# Patient Record
Sex: FEMALE | Race: WHITE | NOT HISPANIC OR LATINO | Employment: FULL TIME | URBAN - METROPOLITAN AREA
[De-identification: names, ages, dates, MRNs, and addresses within clinical notes are randomized per-mention and may not be internally consistent; named-entity substitution may affect disease eponyms.]

---

## 2017-05-31 ENCOUNTER — OFFICE VISIT (OUTPATIENT)
Dept: SPORTS MEDICINE | Facility: CLINIC | Age: 47
End: 2017-05-31

## 2017-05-31 VITALS
HEART RATE: 93 BPM | HEIGHT: 63 IN | DIASTOLIC BLOOD PRESSURE: 88 MMHG | BODY MASS INDEX: 33.66 KG/M2 | WEIGHT: 190 LBS | RESPIRATION RATE: 16 BRPM | OXYGEN SATURATION: 98 % | SYSTOLIC BLOOD PRESSURE: 128 MMHG

## 2017-05-31 DIAGNOSIS — M99.00 CRANIAL SOMATIC DYSFUNCTION: ICD-10-CM

## 2017-05-31 DIAGNOSIS — M54.2 NECK PAIN: Primary | ICD-10-CM

## 2017-05-31 DIAGNOSIS — M99.01 CERVICAL SOMATIC DYSFUNCTION: ICD-10-CM

## 2017-05-31 DIAGNOSIS — M99.02 THORACIC REGION SOMATIC DYSFUNCTION: ICD-10-CM

## 2017-05-31 DIAGNOSIS — M79.602 LEFT ARM PAIN: ICD-10-CM

## 2017-05-31 PROCEDURE — 98926 OSTEOPATH MANJ 3-4 REGIONS: CPT | Performed by: FAMILY MEDICINE

## 2017-05-31 PROCEDURE — 99213 OFFICE O/P EST LOW 20 MIN: CPT | Performed by: FAMILY MEDICINE

## 2017-07-21 RX ORDER — FLUCONAZOLE 150 MG/1
150 TABLET ORAL DAILY
Qty: 3 TABLET | Refills: 0 | Status: SHIPPED | OUTPATIENT
Start: 2017-07-21 | End: 2017-11-20

## 2017-11-01 DIAGNOSIS — F41.9 ANXIETY: ICD-10-CM

## 2017-11-01 RX ORDER — ESCITALOPRAM OXALATE 20 MG/1
20 TABLET ORAL DAILY
Qty: 30 TABLET | Refills: 0 | Status: SHIPPED | OUTPATIENT
Start: 2017-11-01 | End: 2017-11-20 | Stop reason: SDUPTHER

## 2017-11-20 ENCOUNTER — OFFICE VISIT (OUTPATIENT)
Dept: FAMILY MEDICINE CLINIC | Facility: CLINIC | Age: 47
End: 2017-11-20

## 2017-11-20 VITALS
BODY MASS INDEX: 35.12 KG/M2 | TEMPERATURE: 98.3 F | DIASTOLIC BLOOD PRESSURE: 70 MMHG | HEART RATE: 94 BPM | WEIGHT: 198.2 LBS | HEIGHT: 63 IN | OXYGEN SATURATION: 98 % | SYSTOLIC BLOOD PRESSURE: 118 MMHG

## 2017-11-20 DIAGNOSIS — R52 BODY ACHES: ICD-10-CM

## 2017-11-20 DIAGNOSIS — K21.9 GASTROESOPHAGEAL REFLUX DISEASE WITHOUT ESOPHAGITIS: ICD-10-CM

## 2017-11-20 DIAGNOSIS — J06.9 ACUTE URI: ICD-10-CM

## 2017-11-20 DIAGNOSIS — E53.8 COBALAMIN DEFICIENCY: ICD-10-CM

## 2017-11-20 DIAGNOSIS — E78.01 FAMILIAL HYPERCHOLESTEROLEMIA: ICD-10-CM

## 2017-11-20 DIAGNOSIS — Z00.00 ROUTINE GENERAL MEDICAL EXAMINATION AT A HEALTH CARE FACILITY: ICD-10-CM

## 2017-11-20 DIAGNOSIS — F41.9 ANXIETY: ICD-10-CM

## 2017-11-20 DIAGNOSIS — R73.03 PREDIABETES: Primary | ICD-10-CM

## 2017-11-20 DIAGNOSIS — E55.9 VITAMIN D DEFICIENCY: ICD-10-CM

## 2017-11-20 LAB
EXPIRATION DATE: NORMAL
FLUAV AG NPH QL: NEGATIVE
FLUBV AG NPH QL: NEGATIVE
HBA1C MFR BLD: 6.7 %
INTERNAL CONTROL: NORMAL
Lab: NORMAL

## 2017-11-20 PROCEDURE — 99214 OFFICE O/P EST MOD 30 MIN: CPT | Performed by: NURSE PRACTITIONER

## 2017-11-20 PROCEDURE — 83036 HEMOGLOBIN GLYCOSYLATED A1C: CPT | Performed by: NURSE PRACTITIONER

## 2017-11-20 PROCEDURE — 87804 INFLUENZA ASSAY W/OPTIC: CPT | Performed by: NURSE PRACTITIONER

## 2017-11-20 RX ORDER — ESOMEPRAZOLE MAGNESIUM 40 MG/1
40 CAPSULE, DELAYED RELEASE ORAL
Qty: 90 CAPSULE | Refills: 3 | Status: SHIPPED | OUTPATIENT
Start: 2017-11-20 | End: 2018-08-20

## 2017-11-20 RX ORDER — ESCITALOPRAM OXALATE 20 MG/1
20 TABLET ORAL DAILY
Qty: 90 TABLET | Refills: 3 | Status: SHIPPED | OUTPATIENT
Start: 2017-11-20 | End: 2018-08-20 | Stop reason: SDUPTHER

## 2017-11-20 NOTE — PROGRESS NOTES
Subjective   Lucy Hilton is a 47 y.o. female.     History of Present Illness   Lucy Hilton 47 y.o. female who presents today for routine follow up check and medication refills.  she has a history of   Patient Active Problem List   Diagnosis   • Anxiety   • Osteoarthritis of carpometacarpal joint of thumb   • Radial styloid tenosynovitis   • Gastroesophageal reflux disease   • Hyperlipidemia   • Cobalamin deficiency   • Vitamin D deficiency   • Prediabetes   • Irritable bowel syndrome   • Body aches   • Acute URI   .  Since the last visit, she has overall felt well.  She has GERD and is well controlled on PPI medication, Hyperlipidemia and here to discuss treatment, Vitamin D deficiency and will update labs to confirm level is at goal >30 and anxiety is well controlled.  she has been compliant with current medications have reviewed them.  The patient denies medication side effects.    Results for orders placed or performed in visit on 11/20/17   POC Glycosylated Hemoglobin (Hb A1C)   Result Value Ref Range    Hemoglobin A1C 6.7 %   POC Influenza A / B   Result Value Ref Range    Rapid Influenza A Ag negative     Rapid Influenza B Ag negative     Internal Control Passed Passed    Lot Number [id number]     Expiration Date 6/2019        Upper Respiratory Infection: Patient complains of symptoms of a URI. Symptoms include sore throat and swollen glands. Onset of symptoms was 3 days ago, unchanged since that time. She also c/o achiness for the past 3 days .  She is drinking plenty of fluids. Evaluation to date: none. Treatment to date: none.        The following portions of the patient's history were reviewed and updated as appropriate: allergies, current medications, past social history and problem list.    Review of Systems   Constitutional: Negative for fever.   All other systems reviewed and are negative.      Objective   /70 (BP Location: Left arm, Patient Position: Sitting)  Pulse 94  Temp 98.3  "°F (36.8 °C)  Ht 63\" (160 cm)  Wt 198 lb 3.2 oz (89.9 kg)  SpO2 98%  BMI 35.11 kg/m2  Physical Exam   Constitutional: She is oriented to person, place, and time. Vital signs are normal. She appears well-developed and well-nourished. No distress.   HENT:   Head: Normocephalic.   Cardiovascular: Normal rate, regular rhythm and normal heart sounds.    Pulmonary/Chest: Effort normal and breath sounds normal.   Neurological: She is alert and oriented to person, place, and time. Gait normal.   Psychiatric: She has a normal mood and affect. Her behavior is normal. Judgment and thought content normal.   Vitals reviewed.      Assessment/Plan   Problem List Items Addressed This Visit        Cardiovascular and Mediastinum    Hyperlipidemia    Relevant Orders    Comprehensive Metabolic Panel    Lipid Panel With LDL / HDL Ratio       Respiratory    Acute URI       Digestive    Gastroesophageal reflux disease    Relevant Medications    esomeprazole (NEXIUM) 40 MG capsule    Cobalamin deficiency    Relevant Orders    Vitamin B12 & Folate    Vitamin D deficiency    Relevant Orders    Vitamin D 25 Hydroxy       Other    Anxiety    Relevant Medications    escitalopram (LEXAPRO) 20 MG tablet    Prediabetes - Primary    Relevant Medications    metFORMIN (GLUCOPHAGE) 1000 MG tablet    Other Relevant Orders    POC Glycosylated Hemoglobin (Hb A1C) (Completed)    Comprehensive Metabolic Panel    Body aches    Relevant Orders    POC Influenza A / B      Other Visit Diagnoses     Routine general medical examination at a health care facility        Relevant Orders    CBC & Differential           follow up after labs   rto in 6 mons  Cont with meds   "

## 2017-12-20 RX ORDER — OXAZEPAM 10 MG
10 CAPSULE ORAL 3 TIMES DAILY PRN
Qty: 90 CAPSULE | Refills: 0 | OUTPATIENT
Start: 2017-12-20 | End: 2018-11-20 | Stop reason: SDUPTHER

## 2018-01-11 RX ORDER — OSELTAMIVIR PHOSPHATE 75 MG/1
75 CAPSULE ORAL 2 TIMES DAILY
Qty: 10 CAPSULE | Refills: 0 | Status: SHIPPED | OUTPATIENT
Start: 2018-01-11 | End: 2018-08-20

## 2018-08-20 ENCOUNTER — OFFICE VISIT (OUTPATIENT)
Dept: FAMILY MEDICINE CLINIC | Facility: CLINIC | Age: 48
End: 2018-08-20

## 2018-08-20 VITALS
SYSTOLIC BLOOD PRESSURE: 126 MMHG | HEART RATE: 96 BPM | TEMPERATURE: 98.3 F | OXYGEN SATURATION: 99 % | HEIGHT: 63 IN | DIASTOLIC BLOOD PRESSURE: 82 MMHG | BODY MASS INDEX: 33.49 KG/M2 | WEIGHT: 189 LBS

## 2018-08-20 DIAGNOSIS — E53.8 COBALAMIN DEFICIENCY: ICD-10-CM

## 2018-08-20 DIAGNOSIS — R73.03 PREDIABETES: Primary | ICD-10-CM

## 2018-08-20 DIAGNOSIS — E55.9 VITAMIN D DEFICIENCY: ICD-10-CM

## 2018-08-20 DIAGNOSIS — F41.9 ANXIETY: ICD-10-CM

## 2018-08-20 DIAGNOSIS — E78.01 FAMILIAL HYPERCHOLESTEROLEMIA: ICD-10-CM

## 2018-08-20 DIAGNOSIS — K21.9 GASTROESOPHAGEAL REFLUX DISEASE WITHOUT ESOPHAGITIS: ICD-10-CM

## 2018-08-20 PROBLEM — J06.9 ACUTE URI: Status: RESOLVED | Noted: 2017-11-20 | Resolved: 2018-08-20

## 2018-08-20 PROBLEM — R52 BODY ACHES: Status: RESOLVED | Noted: 2017-11-20 | Resolved: 2018-08-20

## 2018-08-20 LAB — HBA1C MFR BLD: 6.3 %

## 2018-08-20 PROCEDURE — 99214 OFFICE O/P EST MOD 30 MIN: CPT | Performed by: NURSE PRACTITIONER

## 2018-08-20 PROCEDURE — 83036 HEMOGLOBIN GLYCOSYLATED A1C: CPT | Performed by: NURSE PRACTITIONER

## 2018-08-20 RX ORDER — ESCITALOPRAM OXALATE 20 MG/1
20 TABLET ORAL DAILY
Qty: 90 TABLET | Refills: 3 | Status: SHIPPED | OUTPATIENT
Start: 2018-08-20 | End: 2019-08-21 | Stop reason: SDUPTHER

## 2018-08-20 RX ORDER — ERGOCALCIFEROL 1.25 MG/1
50000 CAPSULE ORAL WEEKLY
Qty: 12 CAPSULE | Refills: 3 | Status: SHIPPED | OUTPATIENT
Start: 2018-08-20 | End: 2019-02-19

## 2018-08-20 NOTE — PROGRESS NOTES
"Subjective   Lucy Hilton is a 48 y.o. female.     History of Present Illness   Lucy Hilton 48 y.o. female who presents today for routine follow up check and medication refills.  she has a history of   Patient Active Problem List   Diagnosis   • Anxiety   • Osteoarthritis of carpometacarpal joint of thumb   • Radial styloid tenosynovitis   • Gastroesophageal reflux disease   • Hyperlipidemia   • Cobalamin deficiency   • Vitamin D deficiency   • Prediabetes   • Irritable bowel syndrome   .  Since the last visit, she has overall felt well.  She has DMII and is well controlled on medication, GERD and is well controlled on PPI medication, Hyperlipidemia and here to discuss treatment, Vitamin D deficiency and will update labs to confirm level is at goal >30 and anxiety under control.  she has been compliant with current medications have reviewed them.  The patient denies medication side effects.    Results for orders placed or performed in visit on 08/20/18   POC Glycosylated Hemoglobin (Hb A1C)   Result Value Ref Range    Hemoglobin A1C 6.3 %         The following portions of the patient's history were reviewed and updated as appropriate: allergies, current medications, past social history and problem list.    Review of Systems   Constitutional: Negative for fever.   All other systems reviewed and are negative.      Objective   /82 (BP Location: Right arm, Patient Position: Sitting)   Pulse 96   Temp 98.3 °F (36.8 °C)   Ht 160 cm (62.99\")   Wt 85.7 kg (189 lb)   SpO2 99%   BMI 33.49 kg/m²   Physical Exam   Constitutional: She is oriented to person, place, and time. Vital signs are normal. She appears well-developed and well-nourished. No distress.   HENT:   Head: Normocephalic.   Cardiovascular: Normal rate, regular rhythm and normal heart sounds.    Pulmonary/Chest: Effort normal and breath sounds normal.   Neurological: She is alert and oriented to person, place, and time. Gait normal. "   Psychiatric: She has a normal mood and affect. Her behavior is normal. Judgment and thought content normal.   Vitals reviewed.      Assessment/Plan   Problem List Items Addressed This Visit        Cardiovascular and Mediastinum    Hyperlipidemia    Relevant Orders    Comprehensive Metabolic Panel    Lipid Panel With LDL / HDL Ratio       Digestive    Gastroesophageal reflux disease    Cobalamin deficiency    Relevant Orders    Vitamin B12 & Folate    Vitamin D deficiency    Relevant Medications    vitamin D (ERGOCALCIFEROL) 36577 units capsule capsule    Other Relevant Orders    Vitamin D 25 Hydroxy       Other    Anxiety    Relevant Medications    escitalopram (LEXAPRO) 20 MG tablet    Prediabetes - Primary    Relevant Medications    metFORMIN (GLUCOPHAGE) 1000 MG tablet    Other Relevant Orders    POC Glycosylated Hemoglobin (Hb A1C) (Completed)    Comprehensive Metabolic Panel        rto in 6 mons   Follow up after labs

## 2018-08-21 LAB
25(OH)D3+25(OH)D2 SERPL-MCNC: 24.2 NG/ML (ref 30–100)
ALBUMIN SERPL-MCNC: 4.3 G/DL (ref 3.5–5.2)
ALBUMIN/GLOB SERPL: 1.7 G/DL
ALP SERPL-CCNC: 78 U/L (ref 39–117)
ALT SERPL-CCNC: 17 U/L (ref 1–33)
AST SERPL-CCNC: 13 U/L (ref 1–32)
BILIRUB SERPL-MCNC: 0.2 MG/DL (ref 0.1–1.2)
BUN SERPL-MCNC: 8 MG/DL (ref 6–20)
BUN/CREAT SERPL: 9.4 (ref 7–25)
CALCIUM SERPL-MCNC: 9.9 MG/DL (ref 8.6–10.5)
CHLORIDE SERPL-SCNC: 101 MMOL/L (ref 98–107)
CHOLEST SERPL-MCNC: 242 MG/DL (ref 0–200)
CO2 SERPL-SCNC: 25.7 MMOL/L (ref 22–29)
CREAT SERPL-MCNC: 0.85 MG/DL (ref 0.57–1)
FOLATE SERPL-MCNC: 10.99 NG/ML (ref 4.78–24.2)
GLOBULIN SER CALC-MCNC: 2.5 GM/DL
GLUCOSE SERPL-MCNC: 121 MG/DL (ref 65–99)
HDLC SERPL-MCNC: 49 MG/DL (ref 40–60)
LDLC SERPL CALC-MCNC: 159 MG/DL (ref 0–100)
LDLC/HDLC SERPL: 3.24 {RATIO}
POTASSIUM SERPL-SCNC: 4.6 MMOL/L (ref 3.5–5.2)
PROT SERPL-MCNC: 6.8 G/DL (ref 6–8.5)
SODIUM SERPL-SCNC: 140 MMOL/L (ref 136–145)
TRIGL SERPL-MCNC: 172 MG/DL (ref 0–150)
VIT B12 SERPL-MCNC: 224 PG/ML (ref 211–946)
VLDLC SERPL CALC-MCNC: 34.4 MG/DL (ref 5–40)

## 2018-10-15 ENCOUNTER — TELEPHONE (OUTPATIENT)
Dept: FAMILY MEDICINE CLINIC | Facility: CLINIC | Age: 48
End: 2018-10-15

## 2018-10-15 DIAGNOSIS — R73.03 PRE-DIABETES: Primary | ICD-10-CM

## 2018-10-15 NOTE — TELEPHONE ENCOUNTER
Patient is wanting a referral to a diabetic nutrition specialist she will schedule her own appointment just needs referral in

## 2018-11-20 RX ORDER — OXAZEPAM 10 MG
10 CAPSULE ORAL 3 TIMES DAILY PRN
Qty: 90 CAPSULE | Refills: 0 | Status: SHIPPED | OUTPATIENT
Start: 2018-11-20 | End: 2020-09-18 | Stop reason: SDUPTHER

## 2018-12-17 DIAGNOSIS — R73.03 PREDIABETES: ICD-10-CM

## 2019-02-19 ENCOUNTER — APPOINTMENT (OUTPATIENT)
Dept: PREADMISSION TESTING | Facility: HOSPITAL | Age: 49
End: 2019-02-19

## 2019-02-19 VITALS
RESPIRATION RATE: 20 BRPM | TEMPERATURE: 97.5 F | WEIGHT: 198.3 LBS | BODY MASS INDEX: 35.14 KG/M2 | SYSTOLIC BLOOD PRESSURE: 137 MMHG | OXYGEN SATURATION: 100 % | HEART RATE: 96 BPM | DIASTOLIC BLOOD PRESSURE: 80 MMHG | HEIGHT: 63 IN

## 2019-02-19 LAB
ANION GAP SERPL CALCULATED.3IONS-SCNC: 9.9 MMOL/L
B-HCG UR QL: NEGATIVE
BASOPHILS # BLD AUTO: 0.08 10*3/MM3 (ref 0–0.2)
BASOPHILS NFR BLD AUTO: 1 % (ref 0–1.5)
BUN BLD-MCNC: 10 MG/DL (ref 6–20)
BUN/CREAT SERPL: 11.4 (ref 7–25)
CALCIUM SPEC-SCNC: 9.6 MG/DL (ref 8.6–10.5)
CHLORIDE SERPL-SCNC: 100 MMOL/L (ref 98–107)
CO2 SERPL-SCNC: 28.1 MMOL/L (ref 22–29)
CREAT BLD-MCNC: 0.88 MG/DL (ref 0.57–1)
DEPRECATED RDW RBC AUTO: 44.4 FL (ref 37–54)
EOSINOPHIL # BLD AUTO: 0.27 10*3/MM3 (ref 0–0.4)
EOSINOPHIL NFR BLD AUTO: 3.5 % (ref 0.3–6.2)
ERYTHROCYTE [DISTWIDTH] IN BLOOD BY AUTOMATED COUNT: 12.9 % (ref 12.3–15.4)
GFR SERPL CREATININE-BSD FRML MDRD: 69 ML/MIN/1.73
GLUCOSE BLD-MCNC: 170 MG/DL (ref 65–99)
HCT VFR BLD AUTO: 50.6 % (ref 34–46.6)
HGB BLD-MCNC: 16.6 G/DL (ref 12–15.9)
IMM GRANULOCYTES # BLD AUTO: 0.03 10*3/MM3 (ref 0–0.05)
IMM GRANULOCYTES NFR BLD AUTO: 0.4 % (ref 0–0.5)
LYMPHOCYTES # BLD AUTO: 2.1 10*3/MM3 (ref 0.7–3.1)
LYMPHOCYTES NFR BLD AUTO: 27 % (ref 19.6–45.3)
MCH RBC QN AUTO: 30.9 PG (ref 26.6–33)
MCHC RBC AUTO-ENTMCNC: 32.8 G/DL (ref 31.5–35.7)
MCV RBC AUTO: 94.1 FL (ref 79–97)
MONOCYTES # BLD AUTO: 0.5 10*3/MM3 (ref 0.1–0.9)
MONOCYTES NFR BLD AUTO: 6.4 % (ref 5–12)
NEUTROPHILS # BLD AUTO: 4.81 10*3/MM3 (ref 1.4–7)
NEUTROPHILS NFR BLD AUTO: 61.7 % (ref 42.7–76)
NRBC BLD AUTO-RTO: 0 /100 WBC (ref 0–0)
PLATELET # BLD AUTO: 316 10*3/MM3 (ref 140–450)
PMV BLD AUTO: 9.9 FL (ref 6–12)
POTASSIUM BLD-SCNC: 4.4 MMOL/L (ref 3.5–5.2)
RBC # BLD AUTO: 5.38 10*6/MM3 (ref 3.77–5.28)
SODIUM BLD-SCNC: 138 MMOL/L (ref 136–145)
WBC NRBC COR # BLD: 7.79 10*3/MM3 (ref 3.4–10.8)

## 2019-02-19 PROCEDURE — 93005 ELECTROCARDIOGRAM TRACING: CPT

## 2019-02-19 PROCEDURE — 36415 COLL VENOUS BLD VENIPUNCTURE: CPT

## 2019-02-19 PROCEDURE — 93010 ELECTROCARDIOGRAM REPORT: CPT | Performed by: INTERNAL MEDICINE

## 2019-02-19 PROCEDURE — 81025 URINE PREGNANCY TEST: CPT | Performed by: OBSTETRICS & GYNECOLOGY

## 2019-02-19 PROCEDURE — 80048 BASIC METABOLIC PNL TOTAL CA: CPT | Performed by: OBSTETRICS & GYNECOLOGY

## 2019-02-19 PROCEDURE — 85025 COMPLETE CBC W/AUTO DIFF WBC: CPT | Performed by: OBSTETRICS & GYNECOLOGY

## 2019-02-19 NOTE — DISCHARGE INSTRUCTIONS
Take the following medications the morning of surgery with a small sip of water:  NONE    ARRIVAL TIME 0800 TO OUT SURGERY CENTER        General Instructions:  • Do not eat solid food after midnight the night before surgery.  • You may drink clear liquids day of surgery but must stop at least one hour before your hospital arrival time (CUTOFF TIME 0700)  • It is beneficial for you to have a clear drink that contains carbohydrates the day of surgery.  We suggest a 12 to 20 ounce bottle of Gatorade or Powerade for non-diabetic patients or a 12 to 20 ounce bottle of G2 or Powerade Zero for diabetic patients. (Pediatric patients, are not advised to drink a 12 to 20 ounce carbohydrate drink)    Clear liquids are liquids you can see through.  Nothing red in color.     Plain water                               Sports drinks  Sodas                                   Gelatin (Jell-O)  Fruit juices without pulp such as white grape juice and apple juice  Popsicles that contain no fruit or yogurt  Tea or coffee (no cream or milk added)  Gatorade / Powerade  G2 / Powerade Zero    • Infants may have breast milk up to four hours before surgery.  • Infants drinking formula may drink formula up to six hours before surgery.   • Patients who avoid smoking, chewing tobacco and alcohol for 4 weeks prior to surgery have a reduced risk of post-operative complications.  Quit smoking as many days before surgery as you can.  • Do not smoke, use chewing tobacco or drink alcohol the day of surgery.   • If applicable bring your C-PAP/ BI-PAP machine.  • Bring any papers given to you in the doctor’s office.  • Wear clean comfortable clothes and socks.  • Do not wear contact lenses or make-up.  Bring a case for your glasses.   • Bring crutches or walker if applicable.  • Remove all piercings.  Leave jewelry and any other valuables at home.  • Hair extensions with metal clips must be removed prior to surgery.  • The Pre-Admission Testing nurse  will instruct you to bring medications if unable to obtain an accurate list in Pre-Admission Testing.            Preventing a Surgical Site Infection:  • For 2 to 3 days before surgery, avoid shaving with a razor because the razor can irritate skin and make it easier to develop an infection.    • Any areas of open skin can increase the risk of a post-operative wound infection by allowing bacteria to enter and travel throughout the body.  Notify your surgeon if you have any skin wounds / rashes even if it is not near the expected surgical site.  The area will need assessed to determine if surgery should be delayed until it is healed.  • The night prior to surgery sleep in a clean bed with clean clothing.  Do not allow pets to sleep with you.  • Shower on the morning of surgery using a fresh bar of anti-bacterial soap (such as Dial) and clean washcloth.  Dry with a clean towel and dress in clean clothing.  • Ask your surgeon if you will be receiving antibiotics prior to surgery.  • Make sure you, your family, and all healthcare providers clean their hands with soap and water or an alcohol based hand  before caring for you or your wound.    Day of surgery:  Upon arrival, a Pre-op nurse and Anesthesiologist will review your health history, obtain vital signs, and answer questions you may have.  The only belongings needed at this time will be your home medications and if applicable your C-PAP/BI-PAP machine.  If you are staying overnight your family can leave the rest of your belongings in the car and bring them to your room later.  A Pre-op nurse will start an IV and you may receive medication in preparation for surgery, including something to help you relax.  Your family will be able to see you in the Pre-op area.  While you are in surgery your family should notify the waiting room  if they leave the waiting room area and provide a contact phone number.    Please be aware that surgery does come  with discomfort.  We want to make every effort to control your discomfort so please discuss any uncontrolled symptoms with your nurse.   Your doctor will most likely have prescribed pain medications.      If you are going home after surgery you will receive individualized written care instructions before being discharged.  A responsible adult must drive you to and from the hospital on the day of your surgery and stay with you for 24 hours.    If you are staying overnight following surgery, you will be transported to your hospital room following the recovery period.  [facility] has all private rooms.    You have received a list of surgical assistants for your reference.  If you have any questions please call Pre-Admission Testing at [phone].  Deductibles and co-payments are collected on the day of service. Please be prepared to pay the required co-pay, deductible or deposit on the day of service as defined by your plan.

## 2019-02-27 ENCOUNTER — ANESTHESIA EVENT (OUTPATIENT)
Dept: PERIOP | Facility: HOSPITAL | Age: 49
End: 2019-02-27

## 2019-02-27 ENCOUNTER — ANESTHESIA (OUTPATIENT)
Dept: PERIOP | Facility: HOSPITAL | Age: 49
End: 2019-02-27

## 2019-02-27 ENCOUNTER — HOSPITAL ENCOUNTER (OUTPATIENT)
Facility: HOSPITAL | Age: 49
Setting detail: HOSPITAL OUTPATIENT SURGERY
Discharge: HOME | End: 2019-02-27
Attending: OBSTETRICS & GYNECOLOGY | Admitting: OBSTETRICS & GYNECOLOGY

## 2019-02-27 VITALS
HEART RATE: 75 BPM | SYSTOLIC BLOOD PRESSURE: 145 MMHG | OXYGEN SATURATION: 99 % | DIASTOLIC BLOOD PRESSURE: 79 MMHG | RESPIRATION RATE: 16 BRPM | TEMPERATURE: 97.1 F

## 2019-02-27 DIAGNOSIS — R87.619 ABNORMAL PAP SMEAR OF CERVIX: ICD-10-CM

## 2019-02-27 LAB
B-HCG UR QL: NEGATIVE
GLUCOSE BLDC GLUCOMTR-MCNC: 101 MG/DL (ref 70–130)
INTERNAL NEGATIVE CONTROL: NEGATIVE
INTERNAL POSITIVE CONTROL: POSITIVE
Lab: NORMAL

## 2019-02-27 PROCEDURE — 82962 GLUCOSE BLOOD TEST: CPT

## 2019-02-27 PROCEDURE — 88307 TISSUE EXAM BY PATHOLOGIST: CPT | Performed by: OBSTETRICS & GYNECOLOGY

## 2019-02-27 PROCEDURE — 81025 URINE PREGNANCY TEST: CPT | Performed by: ANESTHESIOLOGY

## 2019-02-27 PROCEDURE — 25010000002 FENTANYL CITRATE (PF) 100 MCG/2ML SOLUTION: Performed by: ANESTHESIOLOGY

## 2019-02-27 PROCEDURE — 25010000002 DEXAMETHASONE PER 1 MG: Performed by: ANESTHESIOLOGY

## 2019-02-27 PROCEDURE — 25010000002 ONDANSETRON PER 1 MG: Performed by: ANESTHESIOLOGY

## 2019-02-27 PROCEDURE — 25010000002 PROPOFOL 10 MG/ML EMULSION: Performed by: ANESTHESIOLOGY

## 2019-02-27 PROCEDURE — 25010000002 ROPIVACAINE PER 1 MG: Performed by: OBSTETRICS & GYNECOLOGY

## 2019-02-27 PROCEDURE — 25010000002 KETOROLAC TROMETHAMINE PER 15 MG: Performed by: ANESTHESIOLOGY

## 2019-02-27 PROCEDURE — 25010000002 MIDAZOLAM PER 1 MG: Performed by: ANESTHESIOLOGY

## 2019-02-27 PROCEDURE — 88305 TISSUE EXAM BY PATHOLOGIST: CPT | Performed by: OBSTETRICS & GYNECOLOGY

## 2019-02-27 PROCEDURE — 25010000002 PROMETHAZINE PER 50 MG: Performed by: ANESTHESIOLOGY

## 2019-02-27 RX ORDER — DIPHENHYDRAMINE HYDROCHLORIDE 50 MG/ML
12.5 INJECTION INTRAMUSCULAR; INTRAVENOUS
Status: DISCONTINUED | OUTPATIENT
Start: 2019-02-27 | End: 2019-02-27 | Stop reason: HOSPADM

## 2019-02-27 RX ORDER — MIDAZOLAM HYDROCHLORIDE 1 MG/ML
2 INJECTION INTRAMUSCULAR; INTRAVENOUS
Status: DISCONTINUED | OUTPATIENT
Start: 2019-02-27 | End: 2019-02-27 | Stop reason: HOSPADM

## 2019-02-27 RX ORDER — HYDROCODONE BITARTRATE AND ACETAMINOPHEN 5; 325 MG/1; MG/1
1 TABLET ORAL ONCE AS NEEDED
Status: COMPLETED | OUTPATIENT
Start: 2019-02-27 | End: 2019-02-27

## 2019-02-27 RX ORDER — SODIUM CHLORIDE 0.9 % (FLUSH) 0.9 %
3 SYRINGE (ML) INJECTION EVERY 12 HOURS SCHEDULED
Status: DISCONTINUED | OUTPATIENT
Start: 2019-02-27 | End: 2019-02-27 | Stop reason: HOSPADM

## 2019-02-27 RX ORDER — FAMOTIDINE 10 MG/ML
20 INJECTION, SOLUTION INTRAVENOUS ONCE
Status: COMPLETED | OUTPATIENT
Start: 2019-02-27 | End: 2019-02-27

## 2019-02-27 RX ORDER — NALBUPHINE HCL 10 MG/ML
2 AMPUL (ML) INJECTION EVERY 4 HOURS PRN
Status: DISCONTINUED | OUTPATIENT
Start: 2019-02-27 | End: 2019-02-27 | Stop reason: HOSPADM

## 2019-02-27 RX ORDER — IBUPROFEN 800 MG/1
800 TABLET ORAL EVERY 6 HOURS PRN
Qty: 30 TABLET | Refills: 3 | Status: CANCELLED | OUTPATIENT
Start: 2019-02-27

## 2019-02-27 RX ORDER — PROMETHAZINE HYDROCHLORIDE 25 MG/ML
6.25 INJECTION, SOLUTION INTRAMUSCULAR; INTRAVENOUS ONCE AS NEEDED
Status: COMPLETED | OUTPATIENT
Start: 2019-02-27 | End: 2019-02-27

## 2019-02-27 RX ORDER — LIDOCAINE HYDROCHLORIDE 10 MG/ML
0.5 INJECTION, SOLUTION EPIDURAL; INFILTRATION; INTRACAUDAL; PERINEURAL ONCE AS NEEDED
Status: COMPLETED | OUTPATIENT
Start: 2019-02-27 | End: 2019-02-27

## 2019-02-27 RX ORDER — NALOXONE HCL 0.4 MG/ML
0.4 VIAL (ML) INJECTION AS NEEDED
Status: DISCONTINUED | OUTPATIENT
Start: 2019-02-27 | End: 2019-02-27 | Stop reason: HOSPADM

## 2019-02-27 RX ORDER — ONDANSETRON 2 MG/ML
INJECTION INTRAMUSCULAR; INTRAVENOUS AS NEEDED
Status: DISCONTINUED | OUTPATIENT
Start: 2019-02-27 | End: 2019-02-27 | Stop reason: SURG

## 2019-02-27 RX ORDER — MAGNESIUM HYDROXIDE 1200 MG/15ML
LIQUID ORAL AS NEEDED
Status: DISCONTINUED | OUTPATIENT
Start: 2019-02-27 | End: 2019-02-27 | Stop reason: HOSPADM

## 2019-02-27 RX ORDER — DEXAMETHASONE SODIUM PHOSPHATE 10 MG/ML
INJECTION INTRAMUSCULAR; INTRAVENOUS AS NEEDED
Status: DISCONTINUED | OUTPATIENT
Start: 2019-02-27 | End: 2019-02-27 | Stop reason: SURG

## 2019-02-27 RX ORDER — ACETAMINOPHEN 325 MG/1
650 TABLET ORAL ONCE AS NEEDED
Status: DISCONTINUED | OUTPATIENT
Start: 2019-02-27 | End: 2019-02-27 | Stop reason: HOSPADM

## 2019-02-27 RX ORDER — FENTANYL CITRATE 50 UG/ML
INJECTION, SOLUTION INTRAMUSCULAR; INTRAVENOUS AS NEEDED
Status: DISCONTINUED | OUTPATIENT
Start: 2019-02-27 | End: 2019-02-27 | Stop reason: SURG

## 2019-02-27 RX ORDER — ROPIVACAINE HYDROCHLORIDE 5 MG/ML
INJECTION, SOLUTION EPIDURAL; INFILTRATION; PERINEURAL AS NEEDED
Status: DISCONTINUED | OUTPATIENT
Start: 2019-02-27 | End: 2019-02-27 | Stop reason: HOSPADM

## 2019-02-27 RX ORDER — PROPOFOL 10 MG/ML
VIAL (ML) INTRAVENOUS AS NEEDED
Status: DISCONTINUED | OUTPATIENT
Start: 2019-02-27 | End: 2019-02-27 | Stop reason: SURG

## 2019-02-27 RX ORDER — SODIUM CHLORIDE, SODIUM LACTATE, POTASSIUM CHLORIDE, CALCIUM CHLORIDE 600; 310; 30; 20 MG/100ML; MG/100ML; MG/100ML; MG/100ML
9 INJECTION, SOLUTION INTRAVENOUS CONTINUOUS
Status: DISCONTINUED | OUTPATIENT
Start: 2019-02-27 | End: 2019-02-27 | Stop reason: HOSPADM

## 2019-02-27 RX ORDER — FENTANYL CITRATE 50 UG/ML
50 INJECTION, SOLUTION INTRAMUSCULAR; INTRAVENOUS
Status: DISCONTINUED | OUTPATIENT
Start: 2019-02-27 | End: 2019-02-27 | Stop reason: HOSPADM

## 2019-02-27 RX ORDER — PROMETHAZINE HYDROCHLORIDE 25 MG/1
25 TABLET ORAL ONCE AS NEEDED
Status: COMPLETED | OUTPATIENT
Start: 2019-02-27 | End: 2019-02-27

## 2019-02-27 RX ORDER — ACETAMINOPHEN 650 MG/1
650 SUPPOSITORY RECTAL ONCE AS NEEDED
Status: DISCONTINUED | OUTPATIENT
Start: 2019-02-27 | End: 2019-02-27 | Stop reason: HOSPADM

## 2019-02-27 RX ORDER — PROMETHAZINE HYDROCHLORIDE 25 MG/1
25 SUPPOSITORY RECTAL ONCE AS NEEDED
Status: COMPLETED | OUTPATIENT
Start: 2019-02-27 | End: 2019-02-27

## 2019-02-27 RX ORDER — HYDRALAZINE HYDROCHLORIDE 20 MG/ML
5 INJECTION INTRAMUSCULAR; INTRAVENOUS
Status: DISCONTINUED | OUTPATIENT
Start: 2019-02-27 | End: 2019-02-27 | Stop reason: HOSPADM

## 2019-02-27 RX ORDER — OXYCODONE HYDROCHLORIDE AND ACETAMINOPHEN 5; 325 MG/1; MG/1
1 TABLET ORAL EVERY 4 HOURS PRN
Qty: 20 TABLET | Refills: 0 | Status: SHIPPED | OUTPATIENT
Start: 2019-02-27 | End: 2019-12-03

## 2019-02-27 RX ORDER — ACETAMINOPHEN 500 MG
500 TABLET ORAL ONCE
Status: COMPLETED | OUTPATIENT
Start: 2019-02-27 | End: 2019-02-27

## 2019-02-27 RX ORDER — KETOROLAC TROMETHAMINE 30 MG/ML
INJECTION, SOLUTION INTRAMUSCULAR; INTRAVENOUS AS NEEDED
Status: DISCONTINUED | OUTPATIENT
Start: 2019-02-27 | End: 2019-02-27 | Stop reason: SURG

## 2019-02-27 RX ORDER — NALBUPHINE HCL 10 MG/ML
10 AMPUL (ML) INJECTION EVERY 4 HOURS PRN
Status: DISCONTINUED | OUTPATIENT
Start: 2019-02-27 | End: 2019-02-27 | Stop reason: HOSPADM

## 2019-02-27 RX ORDER — SODIUM CHLORIDE 0.9 % (FLUSH) 0.9 %
1-10 SYRINGE (ML) INJECTION AS NEEDED
Status: DISCONTINUED | OUTPATIENT
Start: 2019-02-27 | End: 2019-02-27 | Stop reason: HOSPADM

## 2019-02-27 RX ADMIN — LIDOCAINE HYDROCHLORIDE 0.5 ML: 10 INJECTION, SOLUTION EPIDURAL; INFILTRATION; INTRACAUDAL; PERINEURAL at 08:56

## 2019-02-27 RX ADMIN — ACETAMINOPHEN 500 MG: 500 TABLET, FILM COATED ORAL at 08:58

## 2019-02-27 RX ADMIN — DEXAMETHASONE SODIUM PHOSPHATE 8 MG: 10 INJECTION INTRAMUSCULAR; INTRAVENOUS at 10:35

## 2019-02-27 RX ADMIN — SODIUM CHLORIDE, POTASSIUM CHLORIDE, SODIUM LACTATE AND CALCIUM CHLORIDE 9 ML/HR: 600; 310; 30; 20 INJECTION, SOLUTION INTRAVENOUS at 08:57

## 2019-02-27 RX ADMIN — ONDANSETRON 4 MG: 2 INJECTION INTRAMUSCULAR; INTRAVENOUS at 10:37

## 2019-02-27 RX ADMIN — KETOROLAC TROMETHAMINE 30 MG: 30 INJECTION, SOLUTION INTRAMUSCULAR; INTRAVENOUS at 10:47

## 2019-02-27 RX ADMIN — MIDAZOLAM 2 MG: 1 INJECTION INTRAMUSCULAR; INTRAVENOUS at 08:59

## 2019-02-27 RX ADMIN — FENTANYL CITRATE 50 MCG: 50 INJECTION INTRAMUSCULAR; INTRAVENOUS at 10:26

## 2019-02-27 RX ADMIN — MIDAZOLAM 2 MG: 1 INJECTION INTRAMUSCULAR; INTRAVENOUS at 09:39

## 2019-02-27 RX ADMIN — FAMOTIDINE 20 MG: 10 INJECTION, SOLUTION INTRAVENOUS at 08:59

## 2019-02-27 RX ADMIN — HYDROCODONE BITARTRATE AND ACETAMINOPHEN 1 TABLET: 5; 325 TABLET ORAL at 11:21

## 2019-02-27 RX ADMIN — PROPOFOL 200 MG: 10 INJECTION, EMULSION INTRAVENOUS at 10:27

## 2019-02-27 RX ADMIN — PROMETHAZINE HYDROCHLORIDE 6.25 MG: 25 INJECTION, SOLUTION INTRAMUSCULAR; INTRAVENOUS at 11:19

## 2019-02-27 RX ADMIN — FENTANYL CITRATE 50 MCG: 50 INJECTION INTRAMUSCULAR; INTRAVENOUS at 10:31

## 2019-02-27 RX ADMIN — SODIUM CHLORIDE, POTASSIUM CHLORIDE, SODIUM LACTATE AND CALCIUM CHLORIDE: 600; 310; 30; 20 INJECTION, SOLUTION INTRAVENOUS at 10:25

## 2019-02-27 NOTE — ANESTHESIA POSTPROCEDURE EVALUATION
Patient: Lucy Hilton    Procedure Summary     Date:  02/27/19 Room / Location:   [facility]    Anesthesia Start:  1025 Anesthesia Stop:  1056    Procedure:  LOOP ELECTROCAUTERY EXCISION PROCEDURE AND ENDOMETRIAL BIOPSY (N/A Cervix) Diagnosis:      Surgeon:  Rebecca Bravo MD Provider:  John Martínez MD    Anesthesia Type:  MAC ASA Status:  2          Anesthesia Type: MAC  Last vitals  BP   145/79 (02/27/19 1130)   Temp   36.2 °C (97.1 °F) (02/27/19 1053)   Pulse   75 (02/27/19 1130)   Resp   16 (02/27/19 1130)     SpO2   99 % (02/27/19 1130)     Post Anesthesia Care and Evaluation    Patient location during evaluation: bedside  Patient participation: complete - patient participated  Level of consciousness: awake and alert  Pain management: adequate  Airway patency: patent  Anesthetic complications: No anesthetic complications    Cardiovascular status: acceptable  Respiratory status: acceptable  Hydration status: acceptable    Comments: /79   Pulse 75   Temp 36.2 °C (97.1 °F) (Temporal)   Resp 16   SpO2 99%

## 2019-02-27 NOTE — ANESTHESIA PREPROCEDURE EVALUATION
Anesthesia Evaluation     history of anesthetic complications: PONV  NPO Solid Status: > 8 hours             Airway   Mallampati: II  TM distance: >3 FB  Neck ROM: full  No difficulty expected  Dental - normal exam     Pulmonary - normal exam   (+) a smoker Current Smoked day of surgery,   Cardiovascular   Exercise tolerance: good (4-7 METS)    Rhythm: regular    (+) hyperlipidemia,   (-) murmur      Neuro/Psych  (+) dizziness/light headedness, psychiatric history Anxiety,     GI/Hepatic/Renal/Endo    (+)  GERD poorly controlled,      Musculoskeletal     Abdominal    Substance History      OB/GYN          Other                        Anesthesia Plan    ASA 2     general     intravenous induction   Anesthetic plan, all risks, benefits, and alternatives have been provided, discussed and informed consent has been obtained with: patient.

## 2019-02-27 NOTE — ANESTHESIA PROCEDURE NOTES
Airway  Urgency: elective    Date/Time: 2/27/2019 10:29 AM  Airway not difficult    General Information and Staff    Patient location during procedure: OR  Anesthesiologist: John Martínez MD    Indications and Patient Condition  Indications for airway management: airway protection    Preoxygenated: yes  Mask difficulty assessment: 1 - vent by mask    Final Airway Details  Final airway type: supraglottic airway      Successful airway: classic  Size 4    Number of attempts at approach: 1    Additional Comments  Pre oxygenated  Easy mask vent  Atraumatic lma placement  +etco2

## 2019-02-28 LAB
CYTO UR: NORMAL
LAB AP CASE REPORT: NORMAL
LAB AP DIAGNOSIS COMMENT: NORMAL
PATH REPORT.FINAL DX SPEC: NORMAL
PATH REPORT.GROSS SPEC: NORMAL

## 2019-08-21 ENCOUNTER — TELEPHONE (OUTPATIENT)
Dept: FAMILY MEDICINE CLINIC | Facility: CLINIC | Age: 49
End: 2019-08-21

## 2019-08-21 DIAGNOSIS — F41.9 ANXIETY: ICD-10-CM

## 2019-08-21 RX ORDER — ESCITALOPRAM OXALATE 20 MG/1
20 TABLET ORAL DAILY
Qty: 90 TABLET | Refills: 0 | Status: SHIPPED | OUTPATIENT
Start: 2019-08-21 | End: 2019-11-18 | Stop reason: SDUPTHER

## 2019-11-18 DIAGNOSIS — F41.9 ANXIETY: ICD-10-CM

## 2019-11-18 RX ORDER — ESCITALOPRAM OXALATE 20 MG/1
20 TABLET ORAL DAILY
Qty: 90 TABLET | Refills: 0 | Status: SHIPPED | OUTPATIENT
Start: 2019-11-18 | End: 2019-12-03 | Stop reason: SDUPTHER

## 2019-12-03 ENCOUNTER — OFFICE VISIT (OUTPATIENT)
Dept: FAMILY MEDICINE CLINIC | Facility: CLINIC | Age: 49
End: 2019-12-03

## 2019-12-03 VITALS
OXYGEN SATURATION: 98 % | TEMPERATURE: 98.6 F | HEIGHT: 63 IN | HEART RATE: 86 BPM | SYSTOLIC BLOOD PRESSURE: 122 MMHG | WEIGHT: 202.2 LBS | BODY MASS INDEX: 35.83 KG/M2 | DIASTOLIC BLOOD PRESSURE: 80 MMHG

## 2019-12-03 DIAGNOSIS — F41.9 ANXIETY: ICD-10-CM

## 2019-12-03 DIAGNOSIS — Z13.0 SCREENING FOR IRON DEFICIENCY ANEMIA: ICD-10-CM

## 2019-12-03 DIAGNOSIS — K21.9 GASTROESOPHAGEAL REFLUX DISEASE WITHOUT ESOPHAGITIS: ICD-10-CM

## 2019-12-03 DIAGNOSIS — E11.9 TYPE 2 DIABETES MELLITUS WITHOUT COMPLICATION, WITHOUT LONG-TERM CURRENT USE OF INSULIN (HCC): Primary | ICD-10-CM

## 2019-12-03 DIAGNOSIS — E78.01 FAMILIAL HYPERCHOLESTEROLEMIA: ICD-10-CM

## 2019-12-03 DIAGNOSIS — R42 DIZZINESS: ICD-10-CM

## 2019-12-03 LAB — HBA1C MFR BLD: 6.7 %

## 2019-12-03 PROCEDURE — 99214 OFFICE O/P EST MOD 30 MIN: CPT | Performed by: NURSE PRACTITIONER

## 2019-12-03 PROCEDURE — 83036 HEMOGLOBIN GLYCOSYLATED A1C: CPT | Performed by: NURSE PRACTITIONER

## 2019-12-03 RX ORDER — ESCITALOPRAM OXALATE 20 MG/1
20 TABLET ORAL DAILY
Qty: 90 TABLET | Refills: 3 | Status: SHIPPED | OUTPATIENT
Start: 2019-12-03 | End: 2020-11-10 | Stop reason: SDUPTHER

## 2019-12-03 NOTE — PROGRESS NOTES
"Subjective   Lucy Hilton is a 49 y.o. female.     History of Present Illness    Since the last visit, she has overall felt well.  She has DMII well controlled on medication and will continue regimen, GERD controlled on PPI Rx and anxiety controlled, lipids need rechecking..  she has been compliant with current medications have reviewed them.  The patient denies medication side effects.  Will refill medications. /80   Pulse 86   Temp 98.6 °F (37 °C)   Ht 160 cm (62.99\")   Wt 91.7 kg (202 lb 3.2 oz)   SpO2 98%   BMI 35.83 kg/m²      Pt has been experiencing dizziness and high pitched sounds for the past few months and wanting ears checked    Results for orders placed or performed during the hospital encounter of 02/27/19   POC Urine Pregnancy Test   Result Value Ref Range    HCG, Urine, QL Negative Negative    Lot Number [id number]     Internal Positive Control Positive     Internal Negative Control Negative    POC Glucose Once   Result Value Ref Range    Glucose 101 70 - 130 mg/dL   Tissue Pathology Exam   Result Value Ref Range    Case Report       Surgical Pathology Report                         Case: [id number]                                  Authorizing Provider:  Rebecca Bravo MD        Collected:           02/27/2019 10:40 AM          Ordering Location:     [facility]  Received:            02/27/2019 12:25 PM                                 [facility]                                                                       Pathologist:           Chas Chapman MD                                                          Specimens:   1) - Endometrial Curettings, endometrial curettings                                                 2) - Cervix, leep cone bx                                                                  Final Diagnosis       1. Endometrium, Curettings:   A. Scant endometrial fragments with non-atypical/simple cystic hyperplasia.   B. Fragments of " "non-dysplastic squamous mucosa.   C. Fragments of unremarkable endocervical mucosa.   D. No atypical hyperplasia, dysplasia or malignancy.    2. Cervix, LEEP Conization Procedure:   A. Squamoglandular mucosa and submucosa without dysplasia or malignancy.  See comment.    [facility]/e       Comment       This case is shared in internal consultation with Dr. Jarquin who concurs.    [facility]/e       Gross Description       1. Received in formalin labeled \"endometrial curettings\" is a 2.0 x 1.5 x 0.3 cm aggregate of tan red soft tissue fragments, blood and mucous which is submitted en toto as 1A.    2. Received in formalin labeled \"LEEP cone biopsy\" is an unoriented 1.8 x 1.5 x 1.1 cm cervical cone with a smooth tan pink ectocervix and tan pink glistening endocervical mucosa.  The margins are inked and the specimen is serially sectioned.  Also received in the same container are two irregular fragments of markedly cauterized tan rubbery tissue fragments measuring 0.6 x 0.4 x 0.2 cm and 0.8 x 0.5 x 0.3 cm.  The specimen is entirely submitted as follows:    2A: two smaller tissue fragments en toto  2B-2D: cervical cone  jap/uso/swm/pkm       Microscopic Description       Performed, incorporated in diagnosis.            The following portions of the patient's history were reviewed and updated as appropriate: allergies, current medications, past social history and problem list.    Review of Systems   Constitutional: Negative for fever.   Respiratory: Negative for cough and shortness of breath.    Cardiovascular: Negative for chest pain.   Gastrointestinal: Negative for abdominal pain.   Neurological: Negative for dizziness.       Objective   /80   Pulse 86   Temp 98.6 °F (37 °C)   Ht 160 cm (62.99\")   Wt 91.7 kg (202 lb 3.2 oz)   SpO2 98%   BMI 35.83 kg/m²   Physical Exam   Constitutional: She is oriented to person, place, and time. Vital signs are normal. She appears well-developed and well-nourished. No distress. "   HENT:   Head: Normocephalic.   Cardiovascular: Normal rate, regular rhythm and normal heart sounds.   Pulmonary/Chest: Effort normal and breath sounds normal.   Neurological: She is alert and oriented to person, place, and time. Gait normal.   Psychiatric: She has a normal mood and affect. Her behavior is normal. Judgment and thought content normal.   Vitals reviewed.    The patient has read and signed the [facility] Controlled Substance Contract.  I will continue to see patient for regular follow up appointments.  They are well controlled on their medication.  KWAN has been reviewed by me and is updated every 3 months. The patient is aware of the potential for addiction and dependence.    Assessment/Plan      Diagnosis Plan   1. Type 2 diabetes mellitus without complication, without long-term current use of insulin (CMS/[facility])  Comprehensive Metabolic Panel    metFORMIN (GLUCOPHAGE) 1000 MG tablet   2. Anxiety  escitalopram (LEXAPRO) 20 MG tablet   3. Familial hypercholesterolemia  Comprehensive Metabolic Panel    Lipid Panel With LDL / HDL Ratio   4. Gastroesophageal reflux disease without esophagitis     5. Screening for iron deficiency anemia  CBC & Differential   6. Dizziness  Ambulatory Referral to ENT (Otolaryngology)     Cont same with meds  Follow up after labs   ZBIGNIEW Rosario  12/3/2019

## 2019-12-04 LAB
ALBUMIN SERPL-MCNC: 4.4 G/DL (ref 3.5–5.5)
ALBUMIN/GLOB SERPL: 2.1 {RATIO} (ref 1.2–2.2)
ALP SERPL-CCNC: 77 IU/L (ref 39–117)
ALT SERPL-CCNC: 19 IU/L (ref 0–32)
AST SERPL-CCNC: 13 IU/L (ref 0–40)
BASOPHILS # BLD AUTO: 0.1 X10E3/UL (ref 0–0.2)
BASOPHILS NFR BLD AUTO: 1 %
BILIRUB SERPL-MCNC: 0.2 MG/DL (ref 0–1.2)
BUN SERPL-MCNC: 10 MG/DL (ref 6–24)
BUN/CREAT SERPL: 12 (ref 9–23)
CALCIUM SERPL-MCNC: 9.6 MG/DL (ref 8.7–10.2)
CHLORIDE SERPL-SCNC: 104 MMOL/L (ref 96–106)
CHOLEST SERPL-MCNC: 238 MG/DL (ref 100–199)
CO2 SERPL-SCNC: 21 MMOL/L (ref 20–29)
CREAT SERPL-MCNC: 0.85 MG/DL (ref 0.57–1)
EOSINOPHIL # BLD AUTO: 0.3 X10E3/UL (ref 0–0.4)
EOSINOPHIL NFR BLD AUTO: 4 %
ERYTHROCYTE [DISTWIDTH] IN BLOOD BY AUTOMATED COUNT: 13.3 % (ref 12.3–15.4)
GLOBULIN SER CALC-MCNC: 2.1 G/DL (ref 1.5–4.5)
GLUCOSE SERPL-MCNC: 135 MG/DL (ref 65–99)
HCT VFR BLD AUTO: 47 % (ref 34–46.6)
HDLC SERPL-MCNC: 49 MG/DL
HGB BLD-MCNC: 16.4 G/DL (ref 11.1–15.9)
IMM GRANULOCYTES # BLD AUTO: 0 X10E3/UL (ref 0–0.1)
IMM GRANULOCYTES NFR BLD AUTO: 0 %
LDLC SERPL CALC-MCNC: 164 MG/DL (ref 0–99)
LDLC/HDLC SERPL: 3.3 RATIO (ref 0–3.2)
LYMPHOCYTES # BLD AUTO: 2.5 X10E3/UL (ref 0.7–3.1)
LYMPHOCYTES NFR BLD AUTO: 30 %
MCH RBC QN AUTO: 32 PG (ref 26.6–33)
MCHC RBC AUTO-ENTMCNC: 34.9 G/DL (ref 31.5–35.7)
MCV RBC AUTO: 92 FL (ref 79–97)
MONOCYTES # BLD AUTO: 0.6 X10E3/UL (ref 0.1–0.9)
MONOCYTES NFR BLD AUTO: 7 %
NEUTROPHILS # BLD AUTO: 4.6 X10E3/UL (ref 1.4–7)
NEUTROPHILS NFR BLD AUTO: 58 %
PLATELET # BLD AUTO: 295 X10E3/UL (ref 150–450)
POTASSIUM SERPL-SCNC: 4.5 MMOL/L (ref 3.5–5.2)
PROT SERPL-MCNC: 6.5 G/DL (ref 6–8.5)
RBC # BLD AUTO: 5.13 X10E6/UL (ref 3.77–5.28)
SODIUM SERPL-SCNC: 141 MMOL/L (ref 134–144)
TRIGL SERPL-MCNC: 124 MG/DL (ref 0–149)
VLDLC SERPL CALC-MCNC: 25 MG/DL (ref 5–40)
WBC # BLD AUTO: 8.1 X10E3/UL (ref 3.4–10.8)

## 2019-12-09 RX ORDER — ROSUVASTATIN CALCIUM 20 MG/1
20 TABLET, COATED ORAL DAILY
Qty: 90 TABLET | Refills: 3 | Status: SHIPPED | OUTPATIENT
Start: 2019-12-09 | End: 2020-06-08

## 2020-06-08 ENCOUNTER — OFFICE VISIT (OUTPATIENT)
Dept: FAMILY MEDICINE CLINIC | Facility: CLINIC | Age: 50
End: 2020-06-08

## 2020-06-08 VITALS
HEIGHT: 63 IN | DIASTOLIC BLOOD PRESSURE: 76 MMHG | OXYGEN SATURATION: 99 % | BODY MASS INDEX: 35.08 KG/M2 | WEIGHT: 198 LBS | TEMPERATURE: 98.2 F | SYSTOLIC BLOOD PRESSURE: 132 MMHG | HEART RATE: 103 BPM

## 2020-06-08 DIAGNOSIS — K21.9 GASTROESOPHAGEAL REFLUX DISEASE WITHOUT ESOPHAGITIS: ICD-10-CM

## 2020-06-08 DIAGNOSIS — E78.01 FAMILIAL HYPERCHOLESTEROLEMIA: Primary | ICD-10-CM

## 2020-06-08 DIAGNOSIS — E11.9 TYPE 2 DIABETES MELLITUS WITHOUT COMPLICATION, WITHOUT LONG-TERM CURRENT USE OF INSULIN (HCC): ICD-10-CM

## 2020-06-08 DIAGNOSIS — E53.8 VITAMIN B12 DEFICIENCY: ICD-10-CM

## 2020-06-08 LAB — HBA1C MFR BLD: 6.7 %

## 2020-06-08 PROCEDURE — 99214 OFFICE O/P EST MOD 30 MIN: CPT | Performed by: NURSE PRACTITIONER

## 2020-06-08 PROCEDURE — 83036 HEMOGLOBIN GLYCOSYLATED A1C: CPT | Performed by: NURSE PRACTITIONER

## 2020-06-08 NOTE — PROGRESS NOTES
"Subjective   Lucy Hilton is a 50 y.o. female.     History of Present Illness    Since the last visit, she has overall felt well.  She has GERD controlled on PPI Rx, Hyperlipidemia with goals met with current Rx and rechecking lipids today..  she has been compliant with current medications have reviewed them.  The patient denies medication side effects.  Will refill medications. /76 (BP Location: Right arm, Patient Position: Sitting)   Pulse 103   Temp 98.2 °F (36.8 °C)   Ht 160 cm (62.99\")   Wt 89.8 kg (198 lb)   SpO2 99%   BMI 35.08 kg/m²     Results for orders placed or performed in visit on 06/08/20   POC Glycosylated Hemoglobin (Hb A1C)   Result Value Ref Range    Hemoglobin A1C 6.7 %       She never started he lipid medication bc she didn't want the side effects.    The following portions of the patient's history were reviewed and updated as appropriate: allergies, current medications, past social history and problem list.    Review of Systems   Constitutional: Negative for fever.   Respiratory: Negative for cough and shortness of breath.    Cardiovascular: Negative for chest pain.   Gastrointestinal: Negative for abdominal pain.   Endocrine: Negative for polyphagia.   Skin: Negative for pallor.   Neurological: Positive for dizziness. Negative for seizures, speech difficulty and weakness.   Psychiatric/Behavioral: Negative for confusion.       Objective   /76 (BP Location: Right arm, Patient Position: Sitting)   Pulse 103   Temp 98.2 °F (36.8 °C)   Ht 160 cm (62.99\")   Wt 89.8 kg (198 lb)   SpO2 99%   BMI 35.08 kg/m²   Physical Exam   Constitutional: She is oriented to person, place, and time. Vital signs are normal. She appears well-developed and well-nourished. No distress.   HENT:   Head: Normocephalic.   Cardiovascular: Normal rate, regular rhythm and normal heart sounds.   Pulmonary/Chest: Effort normal and breath sounds normal.   Neurological: She is alert and oriented to " person, place, and time. Gait normal.   Psychiatric: She has a normal mood and affect. Her behavior is normal. Judgment and thought content normal.   Vitals reviewed.      Assessment/Plan      Diagnosis Plan   1. Familial hypercholesterolemia  Lipid Panel With LDL / HDL Ratio   2. Gastroesophageal reflux disease without esophagitis     3. Type 2 diabetes mellitus without complication, without long-term current use of insulin (CMS/[facility])  POC Glycosylated Hemoglobin (Hb A1C)   4. Vitamin B12 deficiency  Vitamin B12 & Folate     Pt refusing to go on statin  Follow up after labs   Cont with other meds  Stop metformin  Add januiva  rto in 6 sandeep Westfall, ZBIGNIEW  6/8/2020

## 2020-06-09 ENCOUNTER — LAB (OUTPATIENT)
Dept: LAB | Facility: HOSPITAL | Age: 50
End: 2020-06-09

## 2020-06-09 ENCOUNTER — RESULTS ENCOUNTER (OUTPATIENT)
Dept: FAMILY MEDICINE CLINIC | Facility: CLINIC | Age: 50
End: 2020-06-09

## 2020-06-09 DIAGNOSIS — E53.8 VITAMIN B12 DEFICIENCY: ICD-10-CM

## 2020-06-09 DIAGNOSIS — E78.01 FAMILIAL HYPERCHOLESTEROLEMIA: ICD-10-CM

## 2020-06-09 LAB
CHOLEST SERPL-MCNC: 224 MG/DL (ref 0–200)
FOLATE SERPL-MCNC: 10.49 NG/ML (ref 4.78–24.2)
HDLC SERPL-MCNC: 45 MG/DL (ref 40–60)
LDLC SERPL CALC-MCNC: 159 MG/DL (ref 0–100)
LDLC/HDLC SERPL: 3.52 {RATIO}
TRIGL SERPL-MCNC: 102 MG/DL (ref 0–150)
VIT B12 BLD-MCNC: 206 PG/ML (ref 211–946)
VLDLC SERPL-MCNC: 20.4 MG/DL (ref 5–40)

## 2020-06-09 PROCEDURE — 82607 VITAMIN B-12: CPT | Performed by: NURSE PRACTITIONER

## 2020-06-09 PROCEDURE — 82746 ASSAY OF FOLIC ACID SERUM: CPT | Performed by: NURSE PRACTITIONER

## 2020-06-09 PROCEDURE — 80061 LIPID PANEL: CPT | Performed by: NURSE PRACTITIONER

## 2020-06-09 RX ORDER — CYANOCOBALAMIN 1000 UG/ML
INJECTION, SOLUTION INTRAMUSCULAR; SUBCUTANEOUS
Qty: 10 ML | Refills: 3 | Status: SHIPPED | OUTPATIENT
Start: 2020-06-09 | End: 2020-09-06

## 2020-06-15 ENCOUNTER — TELEPHONE (OUTPATIENT)
Dept: FAMILY MEDICINE CLINIC | Facility: CLINIC | Age: 50
End: 2020-06-15

## 2020-06-15 NOTE — TELEPHONE ENCOUNTER
PATIENT WAS CALLING TO BE TAKEN OFF THE SITagliptin (Januvia) 100 MG tablet. PATIENT STATED IT IS HURTING HER STOMACH AND IT'S MAKING HER DOUBLE OVER IN PAIN.     PATIENT IS REQUESTING TO BE PUT BACK ON THE METFORMIN 1000 MG TABLET     PATIENT SAID SHE ASKED TO BE TAKEN OFF OF IT ORIGINALLY DUE TO A RECALL BUT PATIENT FOUND OUT THE RECALL WAS ONLY FOR THE EXTENDED RELEASE KIND OF METFORMIN NO THE REGULAR ONE.     PATIENT CALL BACK NUMBER [phone]     PATIENT WANTS TO GET THIS CHANGED TODAY SO SHE DOES NOT HAVE TO TAKE THE JANUVIA TOMORROW.     PATIENT CONFIRMED ERIN LEMA [facility] - [place], [place] - [address] - [phone]  - [phone] FX

## 2020-06-18 NOTE — TELEPHONE ENCOUNTER
Left message for pt informed her that she is ok to start back on metformin and stop Januvia. New script of metformin 100 mg bid sent to the pharmacy

## 2020-09-14 ENCOUNTER — OFFICE VISIT (OUTPATIENT)
Dept: INTERNAL MEDICINE | Age: 50
End: 2020-09-14

## 2020-09-14 VITALS
DIASTOLIC BLOOD PRESSURE: 86 MMHG | TEMPERATURE: 97 F | WEIGHT: 199.4 LBS | HEART RATE: 93 BPM | HEIGHT: 63 IN | SYSTOLIC BLOOD PRESSURE: 130 MMHG | OXYGEN SATURATION: 99 % | BODY MASS INDEX: 35.33 KG/M2

## 2020-09-14 DIAGNOSIS — F41.9 ANXIETY: Primary | ICD-10-CM

## 2020-09-14 DIAGNOSIS — Z79.899 CONTROLLED SUBSTANCE AGREEMENT SIGNED: ICD-10-CM

## 2020-09-14 DIAGNOSIS — E53.8 VITAMIN B12 DEFICIENCY: ICD-10-CM

## 2020-09-14 DIAGNOSIS — E11.9 TYPE 2 DIABETES MELLITUS WITHOUT COMPLICATION, WITHOUT LONG-TERM CURRENT USE OF INSULIN (HCC): ICD-10-CM

## 2020-09-14 DIAGNOSIS — E78.49 OTHER HYPERLIPIDEMIA: ICD-10-CM

## 2020-09-14 PROCEDURE — 99215 OFFICE O/P EST HI 40 MIN: CPT | Performed by: NURSE PRACTITIONER

## 2020-09-14 RX ORDER — FLUOXETINE HYDROCHLORIDE 20 MG/1
20 CAPSULE ORAL DAILY
Qty: 60 CAPSULE | Refills: 0 | Status: SHIPPED | OUTPATIENT
Start: 2020-09-14 | End: 2020-11-11

## 2020-09-14 NOTE — PATIENT INSTRUCTIONS
Taper lexapro 20mg: 10mg X 4 weeks, 5mg X 4 weeks then begin prozac 20mg   Contact office if your depression worsens   Go to ER if start to develop feelings for suicide  Take medication as prescribed  If you are having trouble tolerating your medication, contact office before abruptly stopping it    Continue low fat, low carb diet and increase exercise for increased cholesterol     Continue Januvia 100mg daily     Start B12 1000mcg sublingual (under tongue) daily

## 2020-09-14 NOTE — PROGRESS NOTES
"[facility]NAL MEDICINE  ZBIGNIEW Manley Marowelli / 50 y.o. / female  09/14/2020     VITAL SIGNS     Visit Vitals  /86   Pulse 93   Temp 97 °F (36.1 °C)   Ht 160 cm (63\")   Wt 90.4 kg (199 lb 6.4 oz)   SpO2 99%   BMI 35.32 kg/m²       BP Readings from Last 3 Encounters:   09/14/20 130/86   09/06/20 132/92   06/08/20 132/76     Wt Readings from Last 3 Encounters:   09/14/20 90.4 kg (199 lb 6.4 oz)   09/06/20 88.5 kg (195 lb)   06/08/20 89.8 kg (198 lb)     Body mass index is 35.32 kg/m².      MEDICATIONS     Current Outpatient Medications   Medication Sig Dispense Refill   • escitalopram (LEXAPRO) 20 MG tablet Take 1 tablet by mouth Daily. 90 tablet 3   • JANUVIA 100 MG tablet Take 100 mg by mouth Daily.     • oxazepam (SERAX) 10 MG capsule Take 1 capsule by mouth 3 (Three) Times a Day As Needed (as needed). 90 capsule 0   • Cyanocobalamin 1000 MCG sublingual tablet Place 1,000 mcg under the tongue Daily.     • FLUoxetine (PROzac) 20 MG capsule Take 1 capsule by mouth Daily. 60 capsule 0     No current facility-administered medications for this visit.        CHIEF COMPLAINT     Establish Care      ASSESSMENT & PLAN     Problem List Items Addressed This Visit        Cardiovascular and Mediastinum    Hyperlipidemia    Relevant Orders    Comprehensive Metabolic Panel    Lipid Panel With / Chol / HDL Ratio       Digestive    Vitamin B12 deficiency       Endocrine    Type 2 diabetes mellitus without complication, without long-term current use of insulin (CMS/[facility])    Relevant Medications    JANUVIA 100 MG tablet    Other Relevant Orders    Hemoglobin A1c    Comprehensive Metabolic Panel    Microalbumin / Creatinine Urine Ratio - Urine, Clean Catch       Other    Anxiety - Primary    Relevant Medications    escitalopram (LEXAPRO) 20 MG tablet    Other Relevant Orders    Urine Drug Screen - Urine, Clean Catch      Other Visit Diagnoses     Controlled substance agreement signed        Relevant Orders "    Urine Drug Screen - Urine, Clean Catch        Orders Placed This Encounter   Procedures   • Hemoglobin A1c   • Comprehensive Metabolic Panel   • Microalbumin / Creatinine Urine Ratio - Urine, Clean Catch   • Lipid Panel With / Chol / HDL Ratio   • Urine Drug Screen - Urine, Clean Catch     New Medications Ordered This Visit   Medications   • Cyanocobalamin 1000 MCG sublingual tablet     Sig: Place 1,000 mcg under the tongue Daily.   • FLUoxetine (PROzac) 20 MG capsule     Sig: Take 1 capsule by mouth Daily.     Dispense:  60 capsule     Refill:  0       Summary/Discussion:    45 minutes were spent in face-to-face time including 50% and greater than 50% was spent in counseling was spent in counseling and discussion and discussion of other active medical problems.    Anxiety   Taper lexapro 20mg: 10mg X 4 weeks, 5mg X 4 weeks then begin prozac 20mg.   Patient instructed to:   Contact office if your depression worsens   Go to ER if start to develop feelings for suicide  Take medication as prescribed  If you are having trouble tolerating your medication, contact office before abruptly stopping it  Called in Serax 10mg. Dr. Khalif Yoo collaborated in prescribing this medication and name on script and patient controlled substance contract and Jaison .Consent/agreement, UDS, and Jaison all current/reviewed.     Hyperlipidemia   Continue low fat, low carb diet and increase exercise for increased cholesterol. Recheck lipid panel and discuss starting atorvastatin 10mg at next visit. ASCVD risk 3% with comordity diabetes. Refusal of statin at this time due to fear of side effects. Discussed CT calcium score in future.     DMII  Continue Januvia 100mg daily. Recheck HA1c, CMP, and urine albumin.     Vitamin B12 deficiency   Start B12 1000mcg sublingual (under tongue) daily.     Will follow-up with patient pending labs and follow-up in 3 months for prozac and axiety symtoms.     Next Appointment with me: 12/7/2020    Return  "in about 3 months (around 12/14/2020).    _____________________________________________________________________________________    HISTORY OF PRESENT ILLNESS       Patient 50-year-old female with history of anxiety, hyperlipidemia, DMII, and B12 deficiency who presents to office to establish care. Patient previously a patient of Marty COY.    Anxiety     She has taken lexapro 20mg for over 10 years, which she states has caused her significant weight gain. The medication has controlled her mood, but she requests to discontinue due to side effect. She has also been increasingly anxious due to helping with her daughter's new onset of depression. She has taken Serax 10mg PRN in the past for panic attacks. She has two pills remaining from 2018 and states she takes it infrequently. She requests a change of medication today. She denies any suicidal thoughts or ideation today.     PHQ-9:  (0=not at all, 1=several days, 2=more than half, 3=nearly every day)    Past 2 weeks, how often bothered by:      (2) 1. Little interest or pleasure      (0) 2. Feeling down, depressed or hopeless    (1) 3. Trouble falling asleep, staying asleep, or sleeping too much    (3) 4. Feeling tired or having little energy    (0) 5. Poor appetite or overeating               (0) 6. Feeling bad about self, feel like failure    (0) 7. Trouble concentrating    (0) 8. Moving or speaking slowly; fidgety or restless    (0) 9. Thoughts you would better off dead or hurt self      Score: 7       PHQ-9 Score  Provisional Diagnosis          5-9    Minimal Symptoms        10-14  Minor Depression, Dysthymia, Major Depression (Mild)         15-19  Major Depression (Moderate)           >20  Major Depression (Severe)    Hyperlipidemia     Patient has gained weight due to decreased exercise and increased \"fast food\". Discussed increase in LDL/HDL ratio. Patient refused statin at last visit with Marty COY and considered red yeast. Patient did not take " red yeast due to concerns of side effects.     DMII    Compliant with Januvia 100mg. No side effects. Does not check her blood sugars at home.     B12 Deficiency     No B12 injections since 2016. B12 lab 206 on 06/06/20. States she has felt increasing fatigue. No neurologic symptoms.       Patient Care Team:  Dominic Melo APRN as PCP - General (Internal Medicine)      REVIEW OF SYSTEMS     Review of Systems   Constitutional: Positive for fatigue.   Respiratory: Negative.    Cardiovascular: Negative.    Gastrointestinal: Negative.    Neurological: Negative for dizziness, tremors, facial asymmetry, weakness, light-headedness and numbness.   Psychiatric/Behavioral: Negative for suicidal ideas. The patient is nervous/anxious.      PHYSICAL EXAMINATION     Physical Exam  Constitutional:       Appearance: She is obese.   Eyes:      Pupils: Pupils are equal, round, and reactive to light.   Cardiovascular:      Rate and Rhythm: Regular rhythm.   Pulmonary:      Breath sounds: Normal breath sounds.   Neurological:      Mental Status: She is alert and oriented to person, place, and time.      Sensory: No sensory deficit.      Motor: No weakness.      Coordination: Coordination normal.   Psychiatric:         Judgment: Judgment normal.       REVIEWED DATA     Labs:     Lab Results   Component Value Date     12/03/2019    K 4.5 12/03/2019    CALCIUM 9.6 12/03/2019    AST 13 12/03/2019    ALT 19 12/03/2019    BUN 10 12/03/2019    CREATININE 0.85 12/03/2019    CREATININE 0.88 02/19/2019    CREATININE 0.85 08/20/2018    EGFRIFNONA 81 12/03/2019    EGFRIFAFRI 93 12/03/2019       Lab Results   Component Value Date    HGBA1C 6.7 06/08/2020    HGBA1C 6.7 12/03/2019    HGBA1C 6.3 08/20/2018     (H) 12/03/2019     (H) 08/20/2018     (H) 11/08/2016       Lab Results   Component Value Date     (H) 06/09/2020     (H) 12/03/2019     (H) 08/20/2018    HDL 45 06/09/2020    HDL 49 12/03/2019     HDL 49 08/20/2018    TRIG 102 06/09/2020    TRIG 124 12/03/2019    TRIG 172 (H) 08/20/2018       Lab Results   Component Value Date    TSH 1.56 02/04/2016       Lab Results   Component Value Date    WBC 8.1 12/03/2019    HGB 16.4 (H) 12/03/2019    HGB 16.6 (H) 02/19/2019    HGB 15.9 (H) 11/08/2016     12/03/2019       Lab Results   Component Value Date    LEUKOCYTESUR Negative 11/04/2016     Imaging:         Medical Tests:         Summary of old records / correspondence / consultant report:         Request outside records:         ALLERGIES  Allergies   Allergen Reactions   • Amoxicillin GI Intolerance     SEVERE VOMITTING   • Augmentin [Amoxicillin-Pot Clavulanate] GI Intolerance     SEVERE VOMITTING   • Erythromycin GI Intolerance     SEVERE VOMIITING   • Keflex [Cephalexin] GI Intolerance     SEVERE VOMITTING   • Paroxetine Other (See Comments)     FACIAL NUMBNESS   • Sertraline Other (See Comments)     TACHYCARDIA   • Sulfa Antibiotics GI Intolerance     SEVERE VOMITTING        PFSH:     The following portions of the patient's history were reviewed and updated as appropriate: Allergies / Current Medications / Past Medical History / Surgical History / Social History / Family History    PROBLEM LIST   Patient Active Problem List   Diagnosis   • Anxiety   • Osteoarthritis of carpometacarpal joint of thumb   • Radial styloid tenosynovitis   • Gastroesophageal reflux disease   • Hyperlipidemia   • Vitamin B12 deficiency   • Vitamin D deficiency   • Irritable bowel syndrome   • Type 2 diabetes mellitus without complication, without long-term current use of insulin (CMS/[facility])   • Screening for iron deficiency anemia   • Dizziness       PAST MEDICAL HISTORY  Past Medical History:   Diagnosis Date   • Abnormal Pap smear of cervix    • Anxiety    • Arthritis    • Chest pain     R/T GERD - STRESS TEST YRS AGO OK, NO FOLLOW UP NEEDED FROM CARDIO   • Colon spasm    • GERD (gastroesophageal reflux disease)    • History  of concussion    • Hyperlipidemia     DIET CONTROLLED   • PONV (postoperative nausea and vomiting)    • Prediabetes    • Vertigo        SURGICAL HISTORY  Past Surgical History:   Procedure Laterality Date   •  SECTION      X2   • DILATION AND CURETTAGE, DIAGNOSTIC / THERAPEUTIC     • ENDOSCOPY     • LEEP N/A 2019    Procedure: LOOP ELECTROCAUTERY EXCISION PROCEDURE AND ENDOMETRIAL BIOPSY;  Surgeon: Rebecca Bravo MD;  Location: [facility] OR [facility] [place];  Service: Obstetrics/Gynecology   • TONSILLECTOMY     • TUBAL ABDOMINAL LIGATION         SOCIAL HISTORY  Social History     Socioeconomic History   • Marital status:      Spouse name: Not on file   • Number of children: Not on file   • Years of education: Not on file   • Highest education level: Not on file   Tobacco Use   • Smoking status: Current Every Day Smoker     Packs/day: 1.00     Years: 35.00     Pack years: 35.00     Types: Cigarettes     Start date:    • Smokeless tobacco: Never Used   Substance and Sexual Activity   • Alcohol use: Yes     Comment: RARE   • Drug use: No   • Sexual activity: Yes     Partners: Male       FAMILY HISTORY  Family History   Problem Relation Age of Onset   • Lung cancer Father    • Diabetes Father    • Thyroid disease Sister    • Other Mother    • Anxiety disorder Daughter    • Depression Daughter    • Cancer Maternal Grandmother    • Heart disease Maternal Grandmother    • Other Maternal Grandfather    • Diabetes Maternal Grandfather    • Cancer Paternal Grandmother    • Alcohol abuse Paternal Grandfather    • Malig Hyperthermia Neg Hx      Examiner was wearing KN95 mask, face shield and exam gloves during the entire duration of the visit. Patient was masked the entire time.   Minimum social distance of 6 ft maintained entire visit except if physical contact was necessary as documented.     **Dragon Disclaimer:   Much of this encounter note is an electronic transcription/translation of spoken language to printed  text. The electronic translation of spoken language may permit erroneous, or at times, nonsensical words or phrases to be inadvertently transcribed. Although I have reviewed the note for such errors, some may still exist.

## 2020-09-16 LAB
ALBUMIN SERPL-MCNC: 4.5 G/DL (ref 3.5–5.2)
ALBUMIN/CREAT UR: <19 MG/G CREAT (ref 0–29)
ALBUMIN/GLOB SERPL: 2.4 G/DL
ALP SERPL-CCNC: 85 U/L (ref 39–117)
ALT SERPL-CCNC: 19 U/L (ref 1–33)
AMPHETAMINES UR QL SCN: NEGATIVE NG/ML
AST SERPL-CCNC: 10 U/L (ref 1–32)
BARBITURATES UR QL SCN: NEGATIVE NG/ML
BENZODIAZ UR QL SCN: NEGATIVE NG/ML
BILIRUB SERPL-MCNC: 0.2 MG/DL (ref 0–1.2)
BUN SERPL-MCNC: 11 MG/DL (ref 6–20)
BUN/CREAT SERPL: 13.3 (ref 7–25)
BZE UR QL SCN: NEGATIVE NG/ML
CALCIUM SERPL-MCNC: 9.7 MG/DL (ref 8.6–10.5)
CANNABINOIDS UR QL SCN: NEGATIVE NG/ML
CHLORIDE SERPL-SCNC: 105 MMOL/L (ref 98–107)
CHOLEST SERPL-MCNC: 235 MG/DL (ref 0–200)
CHOLEST/HDLC SERPL: 5.47 {RATIO}
CO2 SERPL-SCNC: 28.3 MMOL/L (ref 22–29)
CREAT SERPL-MCNC: 0.83 MG/DL (ref 0.57–1)
CREAT UR-MCNC: 15.7 MG/DL
CREAT UR-MCNC: 19 MG/DL (ref 20–300)
GLOBULIN SER CALC-MCNC: 1.9 GM/DL
GLUCOSE SERPL-MCNC: 99 MG/DL (ref 65–99)
HBA1C MFR BLD: 6.7 % (ref 4.8–5.6)
HDLC SERPL-MCNC: 43 MG/DL (ref 40–60)
LABORATORY COMMENT REPORT: ABNORMAL
LDLC SERPL CALC-MCNC: 153 MG/DL (ref 0–100)
METHADONE UR QL SCN: NEGATIVE NG/ML
MICROALBUMIN UR-MCNC: <3 UG/ML
OPIATES UR QL SCN: NEGATIVE NG/ML
OXYCODONE+OXYMORPHONE UR QL SCN: NEGATIVE NG/ML
PCP UR QL: NEGATIVE NG/ML
PH UR: 5.9 [PH] (ref 4.5–8.9)
POTASSIUM SERPL-SCNC: 4.7 MMOL/L (ref 3.5–5.2)
PROPOXYPH UR QL SCN: NEGATIVE NG/ML
PROT SERPL-MCNC: 6.4 G/DL (ref 6–8.5)
SODIUM SERPL-SCNC: 141 MMOL/L (ref 136–145)
SP GR UR: 1
TRIGL SERPL-MCNC: 194 MG/DL (ref 0–150)
VLDLC SERPL CALC-MCNC: 38.8 MG/DL

## 2020-09-18 ENCOUNTER — TELEPHONE (OUTPATIENT)
Dept: INTERNAL MEDICINE | Age: 50
End: 2020-09-18

## 2020-09-18 DIAGNOSIS — F41.9 ANXIETY: Primary | ICD-10-CM

## 2020-09-18 RX ORDER — OXAZEPAM 10 MG
10 CAPSULE ORAL 3 TIMES DAILY PRN
Qty: 15 CAPSULE | Refills: 0 | OUTPATIENT
Start: 2020-09-18 | End: 2021-03-04 | Stop reason: SDUPTHER

## 2020-09-18 NOTE — TELEPHONE ENCOUNTER
Please call in oxazepam 10mg capsule.   15 capsule with 0 refill.   Please call in under Dr. Khalif Yoo MD

## 2020-09-18 NOTE — TELEPHONE ENCOUNTER
PT IS REQUESTING A REFILL ON oxazepam (SERAX) 10 MG capsule    [address] - [address] [facility] [address] - [phone]  - [phone]

## 2020-09-18 NOTE — TELEPHONE ENCOUNTER
Copied from Rx Request:    Please call patient and ask how many serax 10mg pills she has needed daily in the past months. I will refill this amount for one month with no refills as she transitions her SSRI. If she needs more than this you may tell her I can refer her to psychology where they can manage her anxiety more closely.          Pt states that she has used at least 15 tabs of Serax.  She will go to Psych but will find MD own her own.   Pt understands that Rx will only be filled for a month at this point

## 2020-09-18 NOTE — TELEPHONE ENCOUNTER
Copied from Lab results  Hemoglobin A1c unchanged from previous visit. Jarek risk factors of smoking, weight gain, hyperlipidemia would recommend a SGLT or GLP-1 at next visit.      LDL and total cholesterol still increased. Recommend starting a statin. Will send to pharmacy if patient acceptable.         Pt notified of above dictation. Pt declines the use of statin at this time. She states she will manage hyperlipidemia with diet change and wt loss. ALISSON

## 2020-10-21 ENCOUNTER — TELEPHONE (OUTPATIENT)
Dept: URGENT CARE | Facility: CLINIC | Age: 50
End: 2020-10-21

## 2020-10-21 NOTE — TELEPHONE ENCOUNTER
----- Message from ZBIGNIEW Barba sent at 10/21/2020  1:15 PM EDT -----  Please inform pt of negative results and follow 'return to work/school' protocol - minimum of 10 days off work/school since sx appeared, minimum of 24 hours without use of fever reducing medication AND improved sx, surgical mask for 14 days or until sx resolved - whichever is longer -  for patient. Thanks!

## 2020-11-10 DIAGNOSIS — F41.9 ANXIETY: ICD-10-CM

## 2020-11-10 NOTE — TELEPHONE ENCOUNTER
Caller: Lucy Hilton    Relationship: Self    Best call back number: [phone]    Medication needed:   Requested Prescriptions     Pending Prescriptions Disp Refills   • escitalopram (LEXAPRO) 20 MG tablet 90 tablet 3     Sig: Take 1 tablet by mouth Daily.       When do you need the refill by: 11/13/20    What details did the patient provide when requesting the medication: PATIENT HAS 4 DAYS OF MEDICATION LEFT. PATIENT STATED THAT SHE DOES NOT WANT TO TAKE THE FLUOXETINE, SHE WANTS TO STAY ON THE LEXAPRO.    Does the patient have less than a 3 day supply:  [] Yes  [x] No    What is the [facility] [address] [facility] - [phone] [facility] [phone]

## 2020-11-11 RX ORDER — ESCITALOPRAM OXALATE 20 MG/1
20 TABLET ORAL DAILY
Qty: 90 TABLET | Refills: 3 | Status: SHIPPED | OUTPATIENT
Start: 2020-11-11 | End: 2021-11-03 | Stop reason: SDUPTHER

## 2020-11-30 ENCOUNTER — TELEPHONE (OUTPATIENT)
Dept: URGENT CARE | Facility: CLINIC | Age: 50
End: 2020-11-30

## 2020-11-30 DIAGNOSIS — L30.4 INTERTRIGO: Primary | ICD-10-CM

## 2020-11-30 RX ORDER — NYSTATIN 100000 [USP'U]/G
POWDER TOPICAL 2 TIMES DAILY
Qty: 60 G | Refills: 0 | Status: SHIPPED | OUTPATIENT
Start: 2020-11-30 | End: 2020-12-07

## 2020-11-30 NOTE — TELEPHONE ENCOUNTER
Pt requesting refill on Nystatin powder for intertrigo, states she is unable to contact GYN. Medication sent in.

## 2021-01-13 ENCOUNTER — OFFICE VISIT (OUTPATIENT)
Dept: INTERNAL MEDICINE | Age: 51
End: 2021-01-13

## 2021-01-13 VITALS
HEART RATE: 98 BPM | WEIGHT: 201.6 LBS | SYSTOLIC BLOOD PRESSURE: 142 MMHG | HEIGHT: 63 IN | TEMPERATURE: 96.8 F | BODY MASS INDEX: 35.72 KG/M2 | DIASTOLIC BLOOD PRESSURE: 98 MMHG | OXYGEN SATURATION: 98 %

## 2021-01-13 DIAGNOSIS — E78.49 OTHER HYPERLIPIDEMIA: Primary | ICD-10-CM

## 2021-01-13 DIAGNOSIS — F41.9 ANXIETY: ICD-10-CM

## 2021-01-13 DIAGNOSIS — E55.9 VITAMIN D DEFICIENCY: ICD-10-CM

## 2021-01-13 DIAGNOSIS — Z12.11 SCREENING FOR MALIGNANT NEOPLASM OF COLON: ICD-10-CM

## 2021-01-13 DIAGNOSIS — E11.9 TYPE 2 DIABETES MELLITUS WITHOUT COMPLICATION, WITHOUT LONG-TERM CURRENT USE OF INSULIN (HCC): ICD-10-CM

## 2021-01-13 DIAGNOSIS — Z11.59 NEED FOR HEPATITIS C SCREENING TEST: ICD-10-CM

## 2021-01-13 PROCEDURE — 99214 OFFICE O/P EST MOD 30 MIN: CPT | Performed by: NURSE PRACTITIONER

## 2021-01-13 NOTE — PROGRESS NOTES
"[facility] INTERNAL MEDICINE  ZBIGNIEW Manley Marowelli / 50 y.o. / female  01/13/2021      VITAL SIGNS     Visit Vitals  /98   Pulse 98   Temp 96.8 °F (36 °C) (Temporal)   Ht 160 cm (63\")   Wt 91.4 kg (201 lb 9.6 oz)   LMP  (LMP Unknown)   SpO2 98%   Breastfeeding No   BMI 35.71 kg/m²       BP Readings from Last 3 Encounters:   01/13/21 142/98   11/28/20 122/84   10/20/20 129/81     Wt Readings from Last 3 Encounters:   01/13/21 91.4 kg (201 lb 9.6 oz)   11/28/20 87.5 kg (193 lb)   10/20/20 88.5 kg (195 lb)     Body mass index is 35.71 kg/m².      MEDICATIONS     Current Outpatient Medications   Medication Sig Dispense Refill   • escitalopram (LEXAPRO) 20 MG tablet Take 1 tablet by mouth Daily. 90 tablet 3   • oxazepam (SERAX) 10 MG capsule Take 1 capsule by mouth 3 (Three) Times a Day As Needed (as needed). 15 capsule 0   • SITagliptin (Januvia) 100 MG tablet Take 1 tablet by mouth Daily. 90 tablet 2     No current facility-administered medications for this visit.        CHIEF COMPLAINT     Hyperlipidemia, Anxiety, Vitamin D Deficiency, and Diabetes Mellitus      ASSESSMENT & PLAN     Problem List Items Addressed This Visit        Cardiac and Vasculature    Hyperlipidemia - Primary    Relevant Orders    Lipid Panel With / Chol / HDL Ratio       Endocrine and Metabolic    Type 2 diabetes mellitus without complication, without long-term current use of insulin (CMS/[facility])    Relevant Medications    SITagliptin (Januvia) 100 MG tablet    Other Relevant Orders    Hemoglobin A1c    Vitamin D deficiency    Relevant Orders    Vitamin D 25 Hydroxy       Mental Health    Anxiety    Relevant Medications    oxazepam (SERAX) 10 MG capsule    escitalopram (LEXAPRO) 20 MG tablet    Other Relevant Orders    Comprehensive Metabolic Panel      Other Visit Diagnoses     Screening for malignant neoplasm of colon        Relevant Orders    Cologuard - Stool, Per Rectum    Need for hepatitis C screening test        " Relevant Orders    Hepatitis C Antibody        Orders Placed This Encounter   Procedures   • Lipid Panel With / Chol / HDL Ratio   • Comprehensive Metabolic Panel   • Hemoglobin A1c   • Cologuard - Stool, Per Rectum   • Vitamin D 25 Hydroxy   • Hepatitis C Antibody     No orders of the defined types were placed in this encounter.      Summary/Discussion:  • Recheck labs with recent lifestyle changes. Continue current medication regimen pending labs today.   • Order for cologuard placed.   • New controlled substance contract signed by myself for serax. KWAN reviewed. Will fill on an as needed basis.   • Follow-up in three months for chronic medical follow-up along with annual physical.     Next Appointment with me: 4/13/2021    Return in about 3 months (around 4/13/2021) for Annual physical, Next scheduled follow up.    _____________________________________________________________________________________    HISTORY OF PRESENT ILLNESS     3 month follow-up for HLD, DMII, anxiety, and patient new request of vitamin D lab check.     Elevated blood pressure reading in office: BP today 142/98. Take blood pressure home readings. Stable 120s/80s with heart rate high 80s to low 90s. Denies any chest pain, shortness of air, headache, visual disturbances, lower extremity leg swelling, or heart palpitations.     HLD: Hesitant to statin therapy. Discussed cardiovascular benefits. Patient has made dietary and exercise changes over the past three months and would like to recheck lipid panel before considering statin therapy. Elevated LDL and triglycerides on last labs 09/2020.     DMII Well controlled on Januvia 100mg daily. HA1c in 09/2020 6.7.    Anxiety: Patient never transitioned to prozac. Would like to remain on lexapro at this time in hopes that lifestyle changes improve weight gain. Has used 5 of 15 serax pills as needed for panic attacks. Overall doing well.     Vitamin D deficiency in the past. No supplementation at  this time. Lab check request.     Refusal of mammogram and colonoscopy. Acceptable to cologuard after discussion. With recent COVID-19 vaccination defer pneumonia vaccination until next visit.     Patient Care Team:  Dominic Melo APRN as PCP - General (Internal Medicine)      REVIEW OF SYSTEMS     Review of Systems   Constitutional: Negative.    Respiratory: Negative.    Cardiovascular: Negative.    Psychiatric/Behavioral: Negative for self-injury, sleep disturbance and suicidal ideas. The patient is nervous/anxious.        PHYSICAL EXAMINATION     Physical Exam  Constitutional:       Appearance: Normal appearance. She is obese.   Cardiovascular:      Rate and Rhythm: Regular rhythm. Tachycardia present.      Pulses: Normal pulses.      Heart sounds: Normal heart sounds.   Pulmonary:      Effort: Pulmonary effort is normal.      Breath sounds: Normal breath sounds.   Musculoskeletal:      Right lower leg: No edema.      Left lower leg: No edema.   Skin:     General: Skin is dry.   Neurological:      Mental Status: She is alert and oriented to person, place, and time.   Psychiatric:         Mood and Affect: Mood normal.         Behavior: Behavior normal.         Thought Content: Thought content normal.         Judgment: Judgment normal.           REVIEWED DATA     Labs:     Lab Results   Component Value Date     09/14/2020    K 4.7 09/14/2020    CALCIUM 9.7 09/14/2020    AST 10 09/14/2020    ALT 19 09/14/2020    BUN 11 09/14/2020    CREATININE 0.83 09/14/2020    CREATININE 0.85 12/03/2019    CREATININE 0.88 02/19/2019    EGFRIFNONA 73 09/14/2020    EGFRIFAFRI 88 09/14/2020       Lab Results   Component Value Date    HGBA1C 6.70 (H) 09/14/2020    HGBA1C 6.7 06/08/2020    HGBA1C 6.7 12/03/2019    GLU 99 09/14/2020     (H) 12/03/2019     (H) 08/20/2018    MICROALBUR <3.0 09/14/2020       Lab Results   Component Value Date     (H) 09/14/2020     (H) 06/09/2020     (H) 12/03/2019     HDL 43 09/14/2020    HDL 45 06/09/2020    HDL 49 12/03/2019    TRIG 194 (H) 09/14/2020    TRIG 102 06/09/2020    TRIG 124 12/03/2019    CHOLHDLRATIO 5.47 09/14/2020       Lab Results   Component Value Date    TSH 1.56 02/04/2016       Lab Results   Component Value Date    WBC 8.1 12/03/2019    HGB 16.4 (H) 12/03/2019    HGB 16.6 (H) 02/19/2019    HGB 15.9 (H) 11/08/2016     12/03/2019       Lab Results   Component Value Date    LEUKOCYTESUR Negative 11/04/2016       Imaging:         Medical Tests:         Summary of old records / correspondence / consultant report:         Request outside records:         ALLERGIES  Allergies   Allergen Reactions   • Amoxicillin GI Intolerance     SEVERE VOMITTING   • Augmentin [Amoxicillin-Pot Clavulanate] GI Intolerance     SEVERE VOMITTING   • Erythromycin GI Intolerance     SEVERE VOMIITING   • Keflex [Cephalexin] GI Intolerance     SEVERE VOMITTING   • Paroxetine Other (See Comments)     FACIAL NUMBNESS   • Sertraline Other (See Comments)     TACHYCARDIA   • Sulfa Antibiotics GI Intolerance     SEVERE VOMITTING        PFSH:     The following portions of the patient's history were reviewed and updated as appropriate: Allergies / Current Medications / Past Medical History / Surgical History / Social History / Family History    PROBLEM LIST   Patient Active Problem List   Diagnosis   • Anxiety   • Osteoarthritis of carpometacarpal joint of thumb   • Radial styloid tenosynovitis   • Gastroesophageal reflux disease   • Hyperlipidemia   • Vitamin B12 deficiency   • Vitamin D deficiency   • Irritable bowel syndrome   • Type 2 diabetes mellitus without complication, without long-term current use of insulin (CMS/[facility])   • Screening for iron deficiency anemia   • Dizziness       PAST MEDICAL HISTORY  Past Medical History:   Diagnosis Date   • Abnormal Pap smear of cervix    • Anxiety    • Arthritis    • Chest pain     R/T GERD - STRESS TEST YRS AGO OK, NO FOLLOW UP NEEDED FROM  CARDIO   • Colon spasm    • GERD (gastroesophageal reflux disease)    • History of concussion    • Hyperlipidemia     DIET CONTROLLED   • PONV (postoperative nausea and vomiting)    • Prediabetes    • Vertigo        SURGICAL HISTORY  Past Surgical History:   Procedure Laterality Date   •  SECTION      X2   • DILATION AND CURETTAGE, DIAGNOSTIC / THERAPEUTIC     • ENDOSCOPY     • LEEP N/A 2019    Procedure: LOOP ELECTROCAUTERY EXCISION PROCEDURE AND ENDOMETRIAL BIOPSY;  Surgeon: Rebecca Bravo MD;  Location: [facility];  Service: Obstetrics/Gynecology   • TONSILLECTOMY     • TUBAL ABDOMINAL LIGATION         SOCIAL HISTORY  Social History     Socioeconomic History   • Marital status:      Spouse name: Not on file   • Number of children: Not on file   • Years of education: Not on file   • Highest education level: Not on file   Tobacco Use   • Smoking status: Current Every Day Smoker     Packs/day: 1.00     Years: 35.00     Pack years: 35.00     Types: Cigarettes     Start date:    • Smokeless tobacco: Never Used   Substance and Sexual Activity   • Alcohol use: Yes     Comment: RARE   • Drug use: No   • Sexual activity: Yes     Partners: Male       FAMILY HISTORY  Family History   Problem Relation Age of Onset   • Lung cancer Father    • Diabetes Father    • Thyroid disease Sister    • Other Mother    • Anxiety disorder Daughter    • Depression Daughter    • Cancer Maternal Grandmother    • Heart disease Maternal Grandmother    • Other Maternal Grandfather    • Diabetes Maternal Grandfather    • Cancer Paternal Grandmother    • Alcohol abuse Paternal Grandfather    • Malig Hyperthermia Neg Hx          Examiner was wearing [id number] mask, face shield and exam gloves during the entire duration of the visit. Patient was masked the entire time.   Minimum social distance of 6 ft maintained entire visit except if physical contact was necessary as documented.     **Dragon Disclaimer:   Much of this  encounter note is an electronic transcription/translation of spoken language to printed text. The electronic translation of spoken language may permit erroneous, or at times, nonsensical words or phrases to be inadvertently transcribed. Although I have reviewed the note for such errors, some may still exist.

## 2021-01-14 LAB
25(OH)D3+25(OH)D2 SERPL-MCNC: 22.8 NG/ML (ref 30–100)
ALBUMIN SERPL-MCNC: 4.3 G/DL (ref 3.5–5.2)
ALBUMIN/GLOB SERPL: 2 G/DL
ALP SERPL-CCNC: 83 U/L (ref 39–117)
ALT SERPL-CCNC: 18 U/L (ref 1–33)
AST SERPL-CCNC: 18 U/L (ref 1–32)
BILIRUB SERPL-MCNC: 0.3 MG/DL (ref 0–1.2)
BUN SERPL-MCNC: 8 MG/DL (ref 6–20)
BUN/CREAT SERPL: 10 (ref 7–25)
CALCIUM SERPL-MCNC: 9.7 MG/DL (ref 8.6–10.5)
CHLORIDE SERPL-SCNC: 105 MMOL/L (ref 98–107)
CHOLEST SERPL-MCNC: 256 MG/DL (ref 0–200)
CHOLEST/HDLC SERPL: 5.22 {RATIO}
CO2 SERPL-SCNC: 25.7 MMOL/L (ref 22–29)
CREAT SERPL-MCNC: 0.8 MG/DL (ref 0.57–1)
GLOBULIN SER CALC-MCNC: 2.1 GM/DL
GLUCOSE SERPL-MCNC: 129 MG/DL (ref 65–99)
HBA1C MFR BLD: 6.9 % (ref 4.8–5.6)
HCV AB S/CO SERPL IA: <0.1 S/CO RATIO (ref 0–0.9)
HDLC SERPL-MCNC: 49 MG/DL (ref 40–60)
LDLC SERPL CALC-MCNC: 182 MG/DL (ref 0–100)
POTASSIUM SERPL-SCNC: 4.4 MMOL/L (ref 3.5–5.2)
PROT SERPL-MCNC: 6.4 G/DL (ref 6–8.5)
SODIUM SERPL-SCNC: 139 MMOL/L (ref 136–145)
TRIGL SERPL-MCNC: 135 MG/DL (ref 0–150)
VLDLC SERPL CALC-MCNC: 25 MG/DL (ref 5–40)

## 2021-02-09 DIAGNOSIS — R42 VERTIGO: Primary | ICD-10-CM

## 2021-03-04 DIAGNOSIS — F41.9 ANXIETY: ICD-10-CM

## 2021-03-04 RX ORDER — OXAZEPAM 10 MG
10 CAPSULE ORAL 3 TIMES DAILY PRN
Qty: 15 CAPSULE | Refills: 0 | OUTPATIENT
Start: 2021-03-04 | End: 2021-03-12 | Stop reason: SDUPTHER

## 2021-03-09 DIAGNOSIS — F41.9 ANXIETY: Primary | ICD-10-CM

## 2021-03-09 DIAGNOSIS — Z79.899 LONG-TERM CURRENT USE OF ANXIOLYTIC MEDICATION: ICD-10-CM

## 2021-03-12 DIAGNOSIS — F41.9 ANXIETY: ICD-10-CM

## 2021-03-12 RX ORDER — OXAZEPAM 10 MG
10 CAPSULE ORAL 3 TIMES DAILY PRN
Qty: 9 CAPSULE | Refills: 0 | Status: SHIPPED | OUTPATIENT
Start: 2021-03-12 | End: 2021-05-24 | Stop reason: SDUPTHER

## 2021-03-29 ENCOUNTER — OFFICE VISIT (OUTPATIENT)
Dept: INTERNAL MEDICINE | Age: 51
End: 2021-03-29

## 2021-03-29 VITALS
TEMPERATURE: 96.6 F | SYSTOLIC BLOOD PRESSURE: 138 MMHG | WEIGHT: 202.4 LBS | HEIGHT: 63 IN | BODY MASS INDEX: 35.86 KG/M2 | DIASTOLIC BLOOD PRESSURE: 100 MMHG | HEART RATE: 107 BPM | OXYGEN SATURATION: 98 %

## 2021-03-29 DIAGNOSIS — Z00.00 ROUTINE ADULT HEALTH MAINTENANCE: ICD-10-CM

## 2021-03-29 DIAGNOSIS — E11.43 TYPE 2 DIABETES MELLITUS WITH DIABETIC AUTONOMIC NEUROPATHY, WITHOUT LONG-TERM CURRENT USE OF INSULIN (HCC): ICD-10-CM

## 2021-03-29 DIAGNOSIS — E78.49 OTHER HYPERLIPIDEMIA: ICD-10-CM

## 2021-03-29 DIAGNOSIS — R53.83 FATIGUE, UNSPECIFIED TYPE: Primary | ICD-10-CM

## 2021-03-29 LAB
25(OH)D3+25(OH)D2 SERPL-MCNC: 24.3 NG/ML (ref 30–100)
ALBUMIN SERPL-MCNC: 4.3 G/DL (ref 3.5–5.2)
ALBUMIN/GLOB SERPL: 1.8 G/DL
ALP SERPL-CCNC: 82 U/L (ref 39–117)
ALT SERPL-CCNC: 19 U/L (ref 1–33)
APPEARANCE UR: CLEAR
AST SERPL-CCNC: 11 U/L (ref 1–32)
BASOPHILS # BLD AUTO: 0.09 10*3/MM3 (ref 0–0.2)
BASOPHILS NFR BLD AUTO: 0.8 % (ref 0–1.5)
BILIRUB SERPL-MCNC: 0.2 MG/DL (ref 0–1.2)
BILIRUB UR QL STRIP: NEGATIVE
BUN SERPL-MCNC: 11 MG/DL (ref 6–20)
BUN/CREAT SERPL: 13.4 (ref 7–25)
CALCIUM SERPL-MCNC: 9.7 MG/DL (ref 8.6–10.5)
CHLORIDE SERPL-SCNC: 104 MMOL/L (ref 98–107)
CHOLEST SERPL-MCNC: 259 MG/DL (ref 0–200)
CHOLEST/HDLC SERPL: 5.51 {RATIO}
CO2 SERPL-SCNC: 25 MMOL/L (ref 22–29)
COLOR UR: YELLOW
CREAT SERPL-MCNC: 0.82 MG/DL (ref 0.57–1)
EOSINOPHIL # BLD AUTO: 0.3 10*3/MM3 (ref 0–0.4)
EOSINOPHIL NFR BLD AUTO: 2.5 % (ref 0.3–6.2)
ERYTHROCYTE [DISTWIDTH] IN BLOOD BY AUTOMATED COUNT: 13.2 % (ref 12.3–15.4)
FOLATE SERPL-MCNC: 5.6 NG/ML (ref 4.78–24.2)
GLOBULIN SER CALC-MCNC: 2.4 GM/DL
GLUCOSE SERPL-MCNC: 126 MG/DL (ref 65–99)
GLUCOSE UR QL: NEGATIVE
HBA1C MFR BLD: 6.9 % (ref 4.8–5.6)
HCT VFR BLD AUTO: 48.7 % (ref 34–46.6)
HDLC SERPL-MCNC: 47 MG/DL (ref 40–60)
HGB BLD-MCNC: 16.5 G/DL (ref 12–15.9)
HGB UR QL STRIP: NEGATIVE
IMM GRANULOCYTES # BLD AUTO: 0.1 10*3/MM3 (ref 0–0.05)
IMM GRANULOCYTES NFR BLD AUTO: 0.8 % (ref 0–0.5)
IRON SATN MFR SERPL: 17 % (ref 20–50)
IRON SERPL-MCNC: 80 MCG/DL (ref 37–145)
KETONES UR QL STRIP: NEGATIVE
LDLC SERPL CALC-MCNC: 187 MG/DL (ref 0–100)
LEUKOCYTE ESTERASE UR QL STRIP: NEGATIVE
LYMPHOCYTES # BLD AUTO: 1.59 10*3/MM3 (ref 0.7–3.1)
LYMPHOCYTES NFR BLD AUTO: 13.4 % (ref 19.6–45.3)
MCH RBC QN AUTO: 31.4 PG (ref 26.6–33)
MCHC RBC AUTO-ENTMCNC: 33.9 G/DL (ref 31.5–35.7)
MCV RBC AUTO: 92.6 FL (ref 79–97)
MONOCYTES # BLD AUTO: 0.76 10*3/MM3 (ref 0.1–0.9)
MONOCYTES NFR BLD AUTO: 6.4 % (ref 5–12)
NEUTROPHILS # BLD AUTO: 9.05 10*3/MM3 (ref 1.7–7)
NEUTROPHILS NFR BLD AUTO: 76.1 % (ref 42.7–76)
NITRITE UR QL STRIP: NEGATIVE
NRBC BLD AUTO-RTO: 0 /100 WBC (ref 0–0.2)
PH UR STRIP: 6 [PH] (ref 5–8)
PLATELET # BLD AUTO: 299 10*3/MM3 (ref 140–450)
POTASSIUM SERPL-SCNC: 4.3 MMOL/L (ref 3.5–5.2)
PROT SERPL-MCNC: 6.7 G/DL (ref 6–8.5)
PROT UR QL STRIP: NEGATIVE
RBC # BLD AUTO: 5.26 10*6/MM3 (ref 3.77–5.28)
SODIUM SERPL-SCNC: 141 MMOL/L (ref 136–145)
SP GR UR: 1.01 (ref 1–1.03)
T4 FREE SERPL-MCNC: 1.13 NG/DL (ref 0.93–1.7)
TIBC SERPL-MCNC: 480 MCG/DL
TRIGL SERPL-MCNC: 135 MG/DL (ref 0–150)
TSH SERPL DL<=0.005 MIU/L-ACNC: 1.39 UIU/ML (ref 0.27–4.2)
UIBC SERPL-MCNC: 400 MCG/DL (ref 112–346)
UROBILINOGEN UR STRIP-MCNC: NORMAL MG/DL
VIT B12 SERPL-MCNC: 198 PG/ML (ref 211–946)
VLDLC SERPL CALC-MCNC: 25 MG/DL (ref 5–40)
WBC # BLD AUTO: 11.89 10*3/MM3 (ref 3.4–10.8)

## 2021-03-29 PROCEDURE — 99214 OFFICE O/P EST MOD 30 MIN: CPT | Performed by: NURSE PRACTITIONER

## 2021-03-29 NOTE — PROGRESS NOTES
I N T E R N A L  M E D I C I N E  ENDY SHIN, APRROSEMARIE      ENCOUNTER DATE:  03/29/2021    Lucy Hilton / 51 y.o. / female      CHIEF COMPLAINT / REASON FOR OFFICE VISIT     Fatigue and Hyperglycemia      ASSESSMENT & PLAN     1. Fatigue, unspecified type  - CBC & Differential  - Comprehensive Metabolic Panel  - TSH+Free T4  - Vitamin D 25 Hydroxy  - Vitamin B12  - Folate  - Iron and TIBC    2. Type 2 diabetes mellitus with diabetic autonomic neuropathy, without long-term current use of insulin (CMS/[facility])  - Continue Januvia 100mg daily   - START metformin 500mg twice daily with meals   Check blood sugars twice daily, fasting and nightly/dinner  - Urinalysis With Microscopic If Indicated (No Culture) - Urine, Clean Catch  - Hemoglobin A1c    3. Routine adult health maintenance  - CBC & Differential  - Comprehensive Metabolic Panel  - Lipid Panel With / Chol / HDL Ratio  - TSH+Free T4  - Urinalysis With Microscopic If Indicated (No Culture) - Urine, Clean Catch  - Hemoglobin A1c    4. Other hyperlipidemia  - Lipid Panel With / Chol / HDL Ratio    Orders Placed This Encounter   Procedures   • Comprehensive Metabolic Panel   • Lipid Panel With / Chol / HDL Ratio   • TSH+Free T4   • Urinalysis With Microscopic If Indicated (No Culture) - Urine, Clean Catch   • Hemoglobin A1c   • Vitamin D 25 Hydroxy   • Vitamin B12   • Folate   • Iron and TIBC   • CBC & Differential     New Medications Ordered This Visit   Medications   • metFORMIN (Glucophage) 500 MG tablet     Sig: Take 1 tablet by mouth 2 (Two) Times a Day With Meals.     Dispense:  60 tablet     Refill:  3       SUMMARY/DISCUSSION  • Suspect that fatigue is likely due to increased blood sugars, however we will also perform lab fatigue work-up.  • Follow-up as scheduled for annual physical April 13, 2021.  Will reassess blood sugars at this time and consider insulin resistant lab if warranted.     Next Appointment with me: 4/13/2021    No follow-ups on  "file.      VITAL SIGNS     Visit Vitals  /100   Pulse 107   Temp 96.6 °F (35.9 °C) (Temporal)   Ht 160 cm (63\")   Wt 91.8 kg (202 lb 6.4 oz)   LMP  (LMP Unknown)   SpO2 98%   BMI 35.85 kg/m²       @BP@  Wt Readings from Last 3 Encounters:   03/29/21 91.8 kg (202 lb 6.4 oz)   01/13/21 91.4 kg (201 lb 9.6 oz)   11/28/20 87.5 kg (193 lb)     Body mass index is 35.85 kg/m².      MEDICATIONS AT THE TIME OF OFFICE VISIT     Current Outpatient Medications on File Prior to Visit   Medication Sig   • escitalopram (LEXAPRO) 20 MG tablet Take 1 tablet by mouth Daily.   • oxazepam (SERAX) 10 MG capsule Take 1 capsule by mouth 3 (Three) Times a Day As Needed for Anxiety (as needed).   • SITagliptin (Januvia) 100 MG tablet Take 1 tablet by mouth Daily.     No current facility-administered medications on file prior to visit.         HISTORY OF PRESENT ILLNESS     Patient presents for complaint of fatigue and hyperglycemia.    Hyperlipidemia: Weight gain with current pandemic.  Patient states her caloric content is on the lower end, she still experiences weight gain.    Fatigue: Acceptable to fatigue work-up.  However, does state that her fatigue correlates with higher blood sugars.  No dark stools, blood in her stool.     Diabetes mellitus II: She has been checking her blood sugar approximately twice daily.  Fasting blood sugars in the 140s to 170s, significantly higher than her previous readings.  She was on Metformin 1000 mg twice daily in the past.  She was unable to tolerate the higher dosage and she was taken off and switched to Januvia by her previous PCP.     REVIEW OF SYSTEMS     Constitutional neg except per HPI and fatigue   Resp neg  CV neg    PHYSICAL EXAMINATION     Physical Exam  Constitutional  No distress  Cardiovascular Rate  normal . Rhythm: regular . Heart sounds:  normal  Pulmonary/Chest  Effort normal. Breath sounds:  normal  Psychiatric  Alert. Judgment and thought content normal. Mood normal "     REVIEWED DATA     Labs:   Lab Results   Component Value Date    HGBA1C 6.90 (H) 01/13/2021     Lab Results   Component Value Date    GLUCOSE 170 (H) 02/19/2019    BUN 8 01/13/2021    CREATININE 0.80 01/13/2021    EGFRIFNONA 76 01/13/2021    EGFRIFAFRI 92 01/13/2021    BCR 10.0 01/13/2021    K 4.4 01/13/2021    CO2 25.7 01/13/2021    CALCIUM 9.7 01/13/2021    PROTENTOTREF 6.4 01/13/2021    ALBUMIN 4.30 01/13/2021    LABIL2 2.0 01/13/2021    AST 18 01/13/2021    ALT 18 01/13/2021     Lab Results   Component Value Date    CHOL 224 (H) 06/09/2020    CHLPL 256 (H) 01/13/2021    TRIG 135 01/13/2021    HDL 49 01/13/2021     (H) 01/13/2021         Imaging:           Medical Tests:             Summary of old records / correspondence / consultant report:           Request outside records:           *Examiner was wearing medical surgical mask, face shield and exam gloves during the entire duration of the visit. Patient was masked the entire time.   Minimum social distance of 6 ft maintained entire visit except if physical contact was necessary as documented.     **Dragon Disclaimer:   Much of this encounter note is an electronic transcription/translation of spoken language to printed text. The electronic translation of spoken language may permit erroneous, or at times, nonsensical words or phrases to be inadvertently transcribed. Although I have reviewed the note for such errors, some may still exist.

## 2021-03-30 RX ORDER — MECLIZINE HYDROCHLORIDE 25 MG/1
25 TABLET ORAL 3 TIMES DAILY PRN
Qty: 21 TABLET | Refills: 0 | Status: SHIPPED | OUTPATIENT
Start: 2021-03-30 | End: 2022-11-18

## 2021-03-31 ENCOUNTER — TELEPHONE (OUTPATIENT)
Dept: INTERNAL MEDICINE | Age: 51
End: 2021-03-31

## 2021-03-31 DIAGNOSIS — D58.2 ELEVATED HEMOGLOBIN (HCC): ICD-10-CM

## 2021-03-31 DIAGNOSIS — Z86.69 HISTORY OF OBSTRUCTIVE SLEEP APNEA: Primary | ICD-10-CM

## 2021-03-31 NOTE — TELEPHONE ENCOUNTER
----- Message from ZBIGNIEW Manley sent at 3/30/2021  5:03 PM EDT -----  Lucy,     CBC not show any indication of anemia.  It actually shows increased hemoglobin hematocrit.  These are often elevated in people with sleep apnea, or lung issues such as COPD or emphysema that are often linked to everyday smokers.  Talk about if you having any symptoms at her next visit in April to determine if you need any further work-up of this.    CMP does show elevated fasting glucose at 126.  However other electrolytes, liver and kidneys are all normal.    With continued elevations in cholesterol we may need to consider statin medication near future if you are acceptable.  We can also discuss this in April if you would like.     Thyroid hormones are normal.    Analysis is normal with no infection, protein or blood seen.    Iron is unremarkable.    Folate is normal.    Vitamin B12 and vitamin D are both low.  Suggest at least taking 1000 to 2000 IU of vitamin D daily along with 1000 MCG vitamin B12 over-the-counter on a daily basis.    Let's plan to take Metformin 500 mg twice daily to see if this improves her fatigue, along with adding over-the-counter supplements.     Take care and see you soon!

## 2021-04-13 ENCOUNTER — OFFICE VISIT (OUTPATIENT)
Dept: INTERNAL MEDICINE | Age: 51
End: 2021-04-13

## 2021-04-13 VITALS
WEIGHT: 201.2 LBS | SYSTOLIC BLOOD PRESSURE: 134 MMHG | BODY MASS INDEX: 35.65 KG/M2 | TEMPERATURE: 96.9 F | HEART RATE: 105 BPM | HEIGHT: 63 IN | OXYGEN SATURATION: 100 % | DIASTOLIC BLOOD PRESSURE: 88 MMHG

## 2021-04-13 DIAGNOSIS — E11.9 TYPE 2 DIABETES MELLITUS WITHOUT COMPLICATION, WITHOUT LONG-TERM CURRENT USE OF INSULIN (HCC): Primary | ICD-10-CM

## 2021-04-13 PROCEDURE — 99213 OFFICE O/P EST LOW 20 MIN: CPT | Performed by: NURSE PRACTITIONER

## 2021-04-13 NOTE — PROGRESS NOTES
"    I N T E R N A L  M E D I C I N E  ENDY SHIN, APRN      ENCOUNTER DATE:  04/13/2021    Lucy Hilton / 51 y.o. / female      CHIEF COMPLAINT / REASON FOR OFFICE VISIT     Annual Exam      ASSESSMENT & PLAN     1. Type 2 diabetes mellitus without complication, without long-term current use of insulin (CMS/[facility])  - C-Peptide    Orders Placed This Encounter   Procedures   • C-Peptide     New Medications Ordered This Visit   Medications   • metFORMIN (GLUCOPHAGE) 1000 MG tablet     Sig: Take 1 tablet by mouth 2 (Two) Times a Day With Meals.     Dispense:  60 tablet     Refill:  4       SUMMARY/DISCUSSION  • Follow-up in 3 months to re-evaluate chronic medical conditions, including DMII.   • Call for any increased GI upset with increased metformin.     Next Appointment with me: 7/14/2021    Return in about 3 months (around 7/13/2021) for Next scheduled follow up.      VITAL SIGNS     Visit Vitals  /88   Pulse 105   Temp 96.9 °F (36.1 °C) (Temporal)   Ht 160 cm (63\")   Wt 91.3 kg (201 lb 3.2 oz)   LMP  (LMP Unknown)   SpO2 100%   BMI 35.64 kg/m²     Wt Readings from Last 3 Encounters:   04/13/21 91.3 kg (201 lb 3.2 oz)   03/29/21 91.8 kg (202 lb 6.4 oz)   01/13/21 91.4 kg (201 lb 9.6 oz)     Body mass index is 35.64 kg/m².      MEDICATIONS AT THE TIME OF OFFICE VISIT     Current Outpatient Medications on File Prior to Visit   Medication Sig   • escitalopram (LEXAPRO) 20 MG tablet Take 1 tablet by mouth Daily.   • esomeprazole (nexIUM) 20 MG capsule Take 20 mg by mouth Daily.   • meclizine (ANTIVERT) 25 MG tablet Take 1 tablet by mouth 3 (Three) Times a Day As Needed for Dizziness.   • oxazepam (SERAX) 10 MG capsule Take 1 capsule by mouth 3 (Three) Times a Day As Needed for Anxiety (as needed).   • SITagliptin (Januvia) 100 MG tablet Take 1 tablet by mouth Daily.   • [DISCONTINUED] metFORMIN (Glucophage) 500 MG tablet Take 1 tablet by mouth 2 (Two) Times a Day With Meals.     No current " facility-administered medications on file prior to visit.         HISTORY OF PRESENT ILLNESS     Follow-up appointment regarding diabetes hyperglycemia from 03/29.     Compliant on Januvia 100mg along with addition of metformin 500mg twice daily. GI upset at beginning when taking metformin and januvia concurrently, but has since starting  and has only noticed demi decreased appetite. Would like to continue regimen. Fatigue labs were unremarkable except B12 and vitamin D which she is now taking occasionally. Fatigue has improved with better control of blood sugars. Fasting sugars range from 110s to 150s, with post prandial evening 120s-160s.     REVIEW OF SYSTEMS     Constitutional neg except per HPI; improved fatigue   Resp neg  CV neg    PHYSICAL EXAMINATION     Physical Exam  Constitutional  No distress  Cardiovascular Rate  normal . Rhythm: regular . Heart sounds:  normal  Pulmonary/Chest  Effort normal. Breath sounds:  normal  Psychiatric  Alert. Judgment and thought content normal. Mood normal     REVIEWED DATA     Labs:   Lab Results   Component Value Date    HGBA1C 6.90 (H) 03/29/2021     Lab Results   Component Value Date    GLUCOSE 170 (H) 02/19/2019    BUN 11 03/29/2021    CREATININE 0.82 03/29/2021    EGFRIFNONA 73 03/29/2021    EGFRIFAFRI 89 03/29/2021    BCR 13.4 03/29/2021    K 4.3 03/29/2021    CO2 25.0 03/29/2021    CALCIUM 9.7 03/29/2021    PROTENTOTREF 6.7 03/29/2021    ALBUMIN 4.30 03/29/2021    LABIL2 1.8 03/29/2021    AST 11 03/29/2021    ALT 19 03/29/2021     Imaging:           Medical Tests:             Summary of old records / correspondence / consultant report:           Request outside records:           *Examiner was wearing medical surgical mask, face shield and exam gloves during the entire duration of the visit. Patient was masked the entire time.   Minimum social distance of 6 ft maintained entire visit except if physical contact was necessary as documented.     **Hoda  Disclaimer:   Much of this encounter note is an electronic transcription/translation of spoken language to printed text. The electronic translation of spoken language may permit erroneous, or at times, nonsensical words or phrases to be inadvertently transcribed. Although I have reviewed the note for such errors, some may still exist.

## 2021-04-14 LAB — C PEPTIDE SERPL-MCNC: 7 NG/ML (ref 1.1–4.4)

## 2021-04-22 ENCOUNTER — APPOINTMENT (OUTPATIENT)
Dept: SLEEP MEDICINE | Facility: HOSPITAL | Age: 51
End: 2021-04-22

## 2021-05-24 DIAGNOSIS — F41.9 ANXIETY: ICD-10-CM

## 2021-05-25 RX ORDER — OXAZEPAM 10 MG
10 CAPSULE ORAL 3 TIMES DAILY PRN
Qty: 9 CAPSULE | Refills: 0 | Status: SHIPPED | OUTPATIENT
Start: 2021-05-25 | End: 2021-08-09 | Stop reason: SDUPTHER

## 2021-06-04 RX ORDER — SITAGLIPTIN 100 MG/1
100 TABLET, FILM COATED ORAL DAILY
Qty: 30 TABLET | Refills: 0 | Status: SHIPPED | OUTPATIENT
Start: 2021-06-04 | End: 2021-07-09 | Stop reason: SDUPTHER

## 2021-08-09 DIAGNOSIS — F41.9 ANXIETY: ICD-10-CM

## 2021-08-09 RX ORDER — OXAZEPAM 10 MG
10 CAPSULE ORAL 3 TIMES DAILY PRN
Qty: 9 CAPSULE | Refills: 0 | Status: SHIPPED | OUTPATIENT
Start: 2021-08-09 | End: 2021-10-11

## 2021-10-11 ENCOUNTER — OFFICE VISIT (OUTPATIENT)
Dept: INTERNAL MEDICINE | Age: 51
End: 2021-10-11

## 2021-10-11 VITALS
TEMPERATURE: 97.5 F | OXYGEN SATURATION: 98 % | HEIGHT: 63 IN | DIASTOLIC BLOOD PRESSURE: 88 MMHG | BODY MASS INDEX: 33.84 KG/M2 | HEART RATE: 89 BPM | SYSTOLIC BLOOD PRESSURE: 130 MMHG | WEIGHT: 191 LBS

## 2021-10-11 DIAGNOSIS — E78.49 OTHER HYPERLIPIDEMIA: ICD-10-CM

## 2021-10-11 DIAGNOSIS — E11.9 TYPE 2 DIABETES MELLITUS WITHOUT COMPLICATION, WITHOUT LONG-TERM CURRENT USE OF INSULIN (HCC): ICD-10-CM

## 2021-10-11 DIAGNOSIS — E55.9 VITAMIN D DEFICIENCY: ICD-10-CM

## 2021-10-11 DIAGNOSIS — K21.9 GASTROESOPHAGEAL REFLUX DISEASE WITHOUT ESOPHAGITIS: ICD-10-CM

## 2021-10-11 DIAGNOSIS — Z79.899 LONG TERM CURRENT USE OF ANTIPSYCHOTIC MEDICATION: ICD-10-CM

## 2021-10-11 DIAGNOSIS — F41.9 ANXIETY: Primary | ICD-10-CM

## 2021-10-11 DIAGNOSIS — D58.2 ELEVATED HEMOGLOBIN (HCC): ICD-10-CM

## 2021-10-11 PROCEDURE — 90732 PPSV23 VACC 2 YRS+ SUBQ/IM: CPT | Performed by: NURSE PRACTITIONER

## 2021-10-11 PROCEDURE — 99214 OFFICE O/P EST MOD 30 MIN: CPT | Performed by: NURSE PRACTITIONER

## 2021-10-11 PROCEDURE — 90471 IMMUNIZATION ADMIN: CPT | Performed by: NURSE PRACTITIONER

## 2021-10-11 RX ORDER — OXAZEPAM 10 MG
10 CAPSULE ORAL DAILY PRN
Qty: 30 CAPSULE | Refills: 0 | Status: SHIPPED | OUTPATIENT
Start: 2021-10-11 | End: 2022-05-27 | Stop reason: SDUPTHER

## 2021-10-11 NOTE — PROGRESS NOTES
I N T E R N A L  M E D I C I N E  ENDY SHIN, ZBIGNIEW      ENCOUNTER DATE:  10/11/2021    Lucy Hilton / 51 y.o. / female      CHIEF COMPLAINT / REASON FOR OFFICE VISIT     Diabetes (6 month f/u), Heartburn, Anxiety, and Hyperlipidemia      ASSESSMENT & PLAN     1. Anxiety  -Continue Lexapro 20 mg daily.  -Serax 10 mg daily only as needed  -Updated controlled substance contract  -Jaison reviewed  - Compliance Drug Analysis, Ur - Urine, Clean Catch    2. Long term current use of antipsychotic medication  - Compliance Drug Analysis, Ur - Urine, Clean Catch  - oxazepam (SERAX) 10 MG capsule; Take 1 capsule by mouth Daily As Needed for Sleep.  Dispense: 30 capsule; Refill: 0    3. Vitamin D deficiency  -Start 1000 to 2000 IU vitamin D daily    4. Type 2 diabetes mellitus without complication, without long-term current use of insulin (HCC)  -Continue Januvia 100 mg daily  -Continue Metformin 1000 mg with breakfast; GI side effects with twice daily dosing  - Comprehensive Metabolic Panel  - Hemoglobin A1c  - TSH+Free T4  - Urinalysis With Microscopic If Indicated (No Culture) - Urine, Clean Catch  - MicroAlbumin, Urine, Random - Urine, Clean Catch    5. Other hyperlipidemia  -Patient declining statin at this time  - Lipid Panel With / Chol / HDL Ratio  - TSH+Free T4    6. Gastroesophageal reflux disease without esophagitis  -Nexium 20 mg daily    7. Elevated hemoglobin (HCC)  -Encouraged to complete sleep study as ordered  -Encouraged tobacco cessation; she declines readiness  - CBC & Differential    Orders Placed This Encounter   Procedures   • Pneumococcal Polysaccharide Vaccine 23-Valent Greater Than or Equal To 3yo Subcutaneous / IM   • Compliance Drug Analysis, Ur - Urine, Clean Catch   • Comprehensive Metabolic Panel   • Hemoglobin A1c   • Lipid Panel With / Chol / HDL Ratio   • TSH+Free T4   • Urinalysis With Microscopic If Indicated (No Culture) - Urine, Clean Catch   • MicroAlbumin, Urine, Random -  "Urine, Clean Catch   • CBC & Differential     New Medications Ordered This Visit   Medications   • esomeprazole (nexIUM) 20 MG capsule     Sig: Take 1 capsule by mouth Daily.     Dispense:  90 capsule     Refill:  3   • oxazepam (SERAX) 10 MG capsule     Sig: Take 1 capsule by mouth Daily As Needed for Sleep.     Dispense:  30 capsule     Refill:  0   • SITagliptin (Januvia) 100 MG tablet     Sig: Take 1 tablet by mouth Daily.     Dispense:  90 tablet     Refill:  3   • metFORMIN (GLUCOPHAGE) 1000 MG tablet     Sig: Take 1 tablet by mouth Daily With Breakfast.     Dispense:  90 tablet     Refill:  3       SUMMARY/DISCUSSION  •       Next Appointment with me: Visit date not found    Return in about 6 months (around 4/11/2022) for Annual physical, Next scheduled follow up.      VITAL SIGNS     Visit Vitals  /88 (Cuff Size: Adult)   Pulse 89   Temp 97.5 °F (36.4 °C) (Temporal)   Ht 160 cm (63\")   Wt 86.6 kg (191 lb)   LMP  (LMP Unknown)   SpO2 98%   BMI 33.83 kg/m²       Wt Readings from Last 3 Encounters:   10/11/21 86.6 kg (191 lb)   04/13/21 91.3 kg (201 lb 3.2 oz)   03/29/21 91.8 kg (202 lb 6.4 oz)     Body mass index is 33.83 kg/m².      MEDICATIONS AT THE TIME OF OFFICE VISIT     Current Outpatient Medications on File Prior to Visit   Medication Sig   • escitalopram (LEXAPRO) 20 MG tablet Take 1 tablet by mouth Daily.   • meclizine (ANTIVERT) 25 MG tablet Take 1 tablet by mouth 3 (Three) Times a Day As Needed for Dizziness.   • [DISCONTINUED] esomeprazole (nexIUM) 20 MG capsule Take 20 mg by mouth Daily.   • [DISCONTINUED] metFORMIN (GLUCOPHAGE) 1000 MG tablet Take 1 tablet by mouth 2 (Two) Times a Day With Meals. (Patient taking differently: Take 1,000 mg by mouth Daily With Breakfast.)   • [DISCONTINUED] oxazepam (SERAX) 10 MG capsule Take 1 capsule by mouth 3 (Three) Times a Day As Needed for Anxiety.   • [DISCONTINUED] SITagliptin (Januvia) 100 MG tablet Take 1 tablet by mouth Daily.     No current " facility-administered medications on file prior to visit.         HISTORY OF PRESENT ILLNESS     Patient presents for 6 months chronic medical follow-up for anxiety, vitamin D deficiency, type 2 diabetes, hyperlipidemia and GERD.    Generalized anxiety: Controlled with Lexapro 20 mg daily.  Using Serax only as needed.  Reported more frequent use with stress with daughter's health.  Denies any suicidal thoughts thoughts of self-harm.    GERD: Well-controlled with Nexium 20 mg daily.  Has been using over-the-counter would like prescription sent in today.    2 diabetes: Last hemoglobin A1c 6.9 with current Januvia 100 mg daily along with Metformin.  Oertli taking 1000 mg daily with breakfast due to GI side effects with higher doses.    Hyperlipidemia: Last .  Declines statin at this time.  Factors for cardiovascular disease of diabetes.    Vitamin D deficiency: No treatment at this time.  Willing to start over-the-counter supplement.    Elevated hemoglobin: Hemoglobin of 16.5 with hematocrit of 48.7.  Current every day smoker.  Was referred to sleep medicine but has not completed sleep study at this time.  States she will have performed after stress declines with daughter's health.        REVIEW OF SYSTEMS     Constitutional neg except per HPI   Resp neg  CV neg  GI GERD   Psych anxiety     PHYSICAL EXAMINATION     Physical Exam  Constitutional  No distress  Cardiovascular Rate  normal . Rhythm: regular . Heart sounds:  normal  Pulmonary/Chest  Effort normal. Breath sounds:  normal  Psychiatric  Alert. Judgment and thought content normal. Mood normal     REVIEWED DATA     Labs:   Lab Results   Component Value Date    HGBA1C 6.90 (H) 03/29/2021     Lab Results   Component Value Date    CHOL 224 (H) 06/09/2020    CHLPL 259 (H) 03/29/2021    TRIG 135 03/29/2021    HDL 47 03/29/2021     (H) 03/29/2021     Lab Results   Component Value Date    GLUCOSE 170 (H) 02/19/2019    BUN 11 03/29/2021    CREATININE 0.82  03/29/2021    EGFRIFNONA 73 03/29/2021    EGFRIFAFRI 89 03/29/2021    BCR 13.4 03/29/2021    K 4.3 03/29/2021    CO2 25.0 03/29/2021    CALCIUM 9.7 03/29/2021    PROTENTOTREF 6.7 03/29/2021    ALBUMIN 4.30 03/29/2021    LABIL2 1.8 03/29/2021    AST 11 03/29/2021    ALT 19 03/29/2021     Imaging:           Medical Tests:             Summary of old records / correspondence / consultant report:           Request outside records:           *Examiner was wearing medical surgical mask, face shield and exam gloves during the entire duration of the visit. Patient was masked the entire time.   Minimum social distance of 6 ft maintained entire visit except if physical contact was necessary as documented.     **Dragon Disclaimer:   Much of this encounter note is an electronic transcription/translation of spoken language to printed text. The electronic translation of spoken language may permit erroneous, or at times, nonsensical words or phrases to be inadvertently transcribed. Although I have reviewed the note for such errors, some may still exist.

## 2021-10-12 LAB
ALBUMIN SERPL-MCNC: 4.3 G/DL (ref 3.8–4.9)
ALBUMIN/GLOB SERPL: 2 {RATIO} (ref 1.2–2.2)
ALP SERPL-CCNC: 88 IU/L (ref 44–121)
ALT SERPL-CCNC: 22 IU/L (ref 0–32)
APPEARANCE UR: CLEAR
AST SERPL-CCNC: 16 IU/L (ref 0–40)
BASOPHILS # BLD AUTO: 0.1 X10E3/UL (ref 0–0.2)
BASOPHILS NFR BLD AUTO: 1 %
BILIRUB SERPL-MCNC: 0.2 MG/DL (ref 0–1.2)
BILIRUB UR QL STRIP: NEGATIVE
BUN SERPL-MCNC: 10 MG/DL (ref 6–24)
BUN/CREAT SERPL: 12 (ref 9–23)
CALCIUM SERPL-MCNC: 9.5 MG/DL (ref 8.7–10.2)
CHLORIDE SERPL-SCNC: 103 MMOL/L (ref 96–106)
CHOLEST SERPL-MCNC: 260 MG/DL (ref 100–199)
CHOLEST/HDLC SERPL: 5.8 RATIO (ref 0–4.4)
CO2 SERPL-SCNC: 23 MMOL/L (ref 20–29)
COLOR UR: YELLOW
CREAT SERPL-MCNC: 0.81 MG/DL (ref 0.57–1)
EOSINOPHIL # BLD AUTO: 0.3 X10E3/UL (ref 0–0.4)
EOSINOPHIL NFR BLD AUTO: 4 %
ERYTHROCYTE [DISTWIDTH] IN BLOOD BY AUTOMATED COUNT: 13 % (ref 11.7–15.4)
GLOBULIN SER CALC-MCNC: 2.2 G/DL (ref 1.5–4.5)
GLUCOSE SERPL-MCNC: 119 MG/DL (ref 65–99)
GLUCOSE UR QL: NEGATIVE
HBA1C MFR BLD: 6.6 % (ref 4.8–5.6)
HCT VFR BLD AUTO: 47.7 % (ref 34–46.6)
HDLC SERPL-MCNC: 45 MG/DL
HGB BLD-MCNC: 16.1 G/DL (ref 11.1–15.9)
HGB UR QL STRIP: NEGATIVE
IMM GRANULOCYTES # BLD AUTO: 0 X10E3/UL (ref 0–0.1)
IMM GRANULOCYTES NFR BLD AUTO: 0 %
KETONES UR QL STRIP: NEGATIVE
LDLC SERPL CALC-MCNC: 185 MG/DL (ref 0–99)
LEUKOCYTE ESTERASE UR QL STRIP: NEGATIVE
LYMPHOCYTES # BLD AUTO: 2 X10E3/UL (ref 0.7–3.1)
LYMPHOCYTES NFR BLD AUTO: 21 %
MCH RBC QN AUTO: 30.4 PG (ref 26.6–33)
MCHC RBC AUTO-ENTMCNC: 33.8 G/DL (ref 31.5–35.7)
MCV RBC AUTO: 90 FL (ref 79–97)
MICRO URNS: NORMAL
MICROALBUMIN UR-MCNC: <3 UG/ML
MONOCYTES # BLD AUTO: 0.7 X10E3/UL (ref 0.1–0.9)
MONOCYTES NFR BLD AUTO: 7 %
NEUTROPHILS # BLD AUTO: 6.5 X10E3/UL (ref 1.4–7)
NEUTROPHILS NFR BLD AUTO: 67 %
NITRITE UR QL STRIP: NEGATIVE
PH UR STRIP: 6.5 [PH] (ref 5–7.5)
PLATELET # BLD AUTO: 317 X10E3/UL (ref 150–450)
POTASSIUM SERPL-SCNC: 4.5 MMOL/L (ref 3.5–5.2)
PROT SERPL-MCNC: 6.5 G/DL (ref 6–8.5)
PROT UR QL STRIP: NEGATIVE
RBC # BLD AUTO: 5.3 X10E6/UL (ref 3.77–5.28)
SODIUM SERPL-SCNC: 140 MMOL/L (ref 134–144)
SP GR UR: 1.01 (ref 1–1.03)
T4 FREE SERPL-MCNC: 1.17 NG/DL (ref 0.82–1.77)
TRIGL SERPL-MCNC: 161 MG/DL (ref 0–149)
TSH SERPL DL<=0.005 MIU/L-ACNC: 2.03 UIU/ML (ref 0.45–4.5)
UROBILINOGEN UR STRIP-MCNC: 0.2 MG/DL (ref 0.2–1)
VLDLC SERPL CALC-MCNC: 30 MG/DL (ref 5–40)
WBC # BLD AUTO: 9.6 X10E3/UL (ref 3.4–10.8)

## 2021-10-15 LAB — DRUGS UR: NORMAL

## 2021-11-03 DIAGNOSIS — F41.9 ANXIETY: ICD-10-CM

## 2021-11-03 RX ORDER — ESCITALOPRAM OXALATE 20 MG/1
20 TABLET ORAL DAILY
Qty: 90 TABLET | Refills: 1 | Status: SHIPPED | OUTPATIENT
Start: 2021-11-03 | End: 2022-04-25 | Stop reason: SDUPTHER

## 2021-12-23 ENCOUNTER — TELEPHONE (OUTPATIENT)
Dept: URGENT CARE | Facility: CLINIC | Age: 51
End: 2021-12-23

## 2022-04-25 DIAGNOSIS — F41.9 ANXIETY: ICD-10-CM

## 2022-04-25 RX ORDER — ESCITALOPRAM OXALATE 20 MG/1
20 TABLET ORAL DAILY
Qty: 90 TABLET | Refills: 1 | Status: SHIPPED | OUTPATIENT
Start: 2022-04-25 | End: 2022-10-17 | Stop reason: SDUPTHER

## 2022-05-27 ENCOUNTER — OFFICE VISIT (OUTPATIENT)
Dept: INTERNAL MEDICINE | Age: 52
End: 2022-05-27

## 2022-05-27 VITALS
DIASTOLIC BLOOD PRESSURE: 80 MMHG | OXYGEN SATURATION: 98 % | WEIGHT: 190.8 LBS | TEMPERATURE: 97.1 F | HEIGHT: 63 IN | BODY MASS INDEX: 33.81 KG/M2 | SYSTOLIC BLOOD PRESSURE: 126 MMHG | HEART RATE: 99 BPM

## 2022-05-27 DIAGNOSIS — D58.2 ELEVATED HEMOGLOBIN: ICD-10-CM

## 2022-05-27 DIAGNOSIS — K21.9 GASTROESOPHAGEAL REFLUX DISEASE WITHOUT ESOPHAGITIS: ICD-10-CM

## 2022-05-27 DIAGNOSIS — E78.49 OTHER HYPERLIPIDEMIA: Primary | ICD-10-CM

## 2022-05-27 DIAGNOSIS — E55.9 VITAMIN D DEFICIENCY: ICD-10-CM

## 2022-05-27 DIAGNOSIS — E11.9 TYPE 2 DIABETES MELLITUS WITHOUT COMPLICATION, WITHOUT LONG-TERM CURRENT USE OF INSULIN: ICD-10-CM

## 2022-05-27 DIAGNOSIS — Z79.899 LONG TERM CURRENT USE OF ANTIPSYCHOTIC MEDICATION: ICD-10-CM

## 2022-05-27 DIAGNOSIS — F41.9 ANXIETY: ICD-10-CM

## 2022-05-27 PROCEDURE — 99214 OFFICE O/P EST MOD 30 MIN: CPT | Performed by: NURSE PRACTITIONER

## 2022-05-27 RX ORDER — OXAZEPAM 10 MG
10 CAPSULE ORAL DAILY PRN
Qty: 30 CAPSULE | Refills: 0 | Status: SHIPPED | OUTPATIENT
Start: 2022-05-27 | End: 2022-11-18 | Stop reason: SDUPTHER

## 2022-05-27 NOTE — PROGRESS NOTES
I N T E R N A L  M E D I C I N E  ENDY SHIN, ZBIGNIEW      ENCOUNTER DATE:  05/27/2022    Lucy Hilton / 52 y.o. / female      CHIEF COMPLAINT / REASON FOR OFFICE VISIT     Diabetes (6 month f/u), Hyperlipidemia, Heartburn, and Anxiety      ASSESSMENT & PLAN     1. Other hyperlipidemia  - Encouraged to start Statin medication, patient declined  - Decrease/eliminate soda, caffeine, alcohol and overall caloric intake. Reduce carbohydrates and sweets in diet.  Continue to improve dietary habits with lean proteins, fresh vegetables, fruits, and nuts. Improve aerobic exercise: walking/biking/swimming daily as tolerated, recommend 30 minutes/day at least 5 days/week.  - Comprehensive Metabolic Panel  - Lipid Panel With / Chol / HDL Ratio  - TSH+Free T4    2. Type 2 diabetes mellitus without complication, without long-term current use of insulin (HCC)  -Continue regimen of metformin 1000 mg daily with breakfast  - Hemoglobin A1c    3. Elevated hemoglobin (HCC)  -Discussed smoking cessation, starting patch this coming week  -Advised sleep medicine referral, declined as she states she will not use CPAP or dental device  - CBC & Differential    4. Vitamin D deficiency  -Has not started supplementation, encouraged to do so 1000 to 2000 units daily    5. Gastroesophageal reflux disease without esophagitis  -Continue Nexium 20 mg daily    6. Anxiety  -Lexapro 20 mg daily  -Serax 10 mg daily as needed  - Compliance Drug Analysis, Ur - Urine, Clean Catch    7. Long term current use of antipsychotic medication  -Controlled substance contract updated today, Jaison reviewed  - oxazepam (SERAX) 10 MG capsule; Take 1 capsule by mouth Daily As Needed for Sleep.  Dispense: 30 capsule; Refill: 0    Orders Placed This Encounter   Procedures   • Shingrix Vaccine   • Compliance Drug Analysis, Ur - Urine, Clean Catch   • Comprehensive Metabolic Panel   • Hemoglobin A1c   • Lipid Panel With / Chol / HDL Ratio   • TSH+Free T4   • CBC  "& Differential     New Medications Ordered This Visit   Medications   • oxazepam (SERAX) 10 MG capsule     Sig: Take 1 capsule by mouth Daily As Needed for Sleep.     Dispense:  30 capsule     Refill:  0       SUMMARY/DISCUSSION  • Follow-up in 6 months for chronic medical, earlier if needed  • Declines all health maintenance at this time with recent diagnosis of 's cancer    Next Appointment with me: Visit date not found    Return in about 6 months (around 11/27/2022) for Next scheduled follow up.      VITAL SIGNS     Visit Vitals  /80   Pulse 99   Temp 97.1 °F (36.2 °C) (Temporal)   Ht 160 cm (63\")   Wt 86.5 kg (190 lb 12.8 oz)   LMP  (LMP Unknown)   SpO2 98%   BMI 33.80 kg/m²     Wt Readings from Last 3 Encounters:   05/27/22 86.5 kg (190 lb 12.8 oz)   04/18/22 83.9 kg (185 lb)   12/22/21 84.4 kg (186 lb)     Body mass index is 33.8 kg/m².      MEDICATIONS AT THE TIME OF OFFICE VISIT     Current Outpatient Medications on File Prior to Visit   Medication Sig   • escitalopram (LEXAPRO) 20 MG tablet Take 1 tablet by mouth Daily.   • esomeprazole (nexIUM) 20 MG capsule Take 1 capsule by mouth Daily.   • metFORMIN (GLUCOPHAGE) 1000 MG tablet Take 1 tablet by mouth Daily With Breakfast.   • SITagliptin (Januvia) 100 MG tablet Take 1 tablet by mouth Daily.   • [DISCONTINUED] oxazepam (SERAX) 10 MG capsule Take 1 capsule by mouth Daily As Needed for Sleep.   • meclizine (ANTIVERT) 25 MG tablet Take 1 tablet by mouth 3 (Three) Times a Day As Needed for Dizziness.     No current facility-administered medications on file prior to visit.         HISTORY OF PRESENT ILLNESS     Patient presents for 6 months chronic medical follow-up for anxiety, vitamin D deficiency, type 2 diabetes, hyperlipidemia and GERD.     Generalized anxiety: Controlled with Lexapro 20 mg daily.  Using Serax only as needed.  Reported more frequent use with stress with daughter's health along with 's recent diagnosis of stage IV " cancer.  Denies any suicidal thoughts thoughts of self-harm.     GERD: Well-controlled with Nexium 20 mg daily.  Has been using over-the-counter would like prescription sent in today.     2 diabetes: Last hemoglobin A1c 6.6 with current Januvia 100 mg daily along with Metformin.  Oertli taking 1000 mg daily with breakfast due to GI side effects with higher doses.  Denies neuropathy     Hyperlipidemia: Last .  Declines statin at this time.  Discussed factors for cardiovascular disease of diabetes.     Vitamin D deficiency: No treatment at this time.    Did not start vitamin D supplement as discussed.     Elevated hemoglobin: Hemoglobin of 16.1 with hematocrit of 47.7.  Current every day smoker.  Was referred to sleep medicine but has not completed sleep study at this time.  Declining sleep medicine study as she states she will be noncompliant with CPAP or dental device.  Planning to quit smoking as she will be at the hospital with  with recent diagnosis.  Plans to start the nicotine patch shortly to help with smoking cessation.    REVIEW OF SYSTEMS     Constitutional neg except per HPI   Resp neg  CV neg  Psych anxious     PHYSICAL EXAMINATION     Physical Exam  Constitutional  No distress  Cardiovascular Rate  normal . Rhythm: regular . Heart sounds:  normal  Pulmonary/Chest  Effort normal. Breath sounds:  normal  Psychiatric  Alert. Judgment and thought content normal. Mood normal     REVIEWED DATA     Labs:   Lab Results   Component Value Date    HGBA1C 6.6 (H) 10/11/2021     Lab Results   Component Value Date    GLUCOSE 119 (H) 10/11/2021    BUN 10 10/11/2021    CREATININE 0.81 10/11/2021    EGFRIFNONA 84 10/11/2021    EGFRIFAFRI 97 10/11/2021    BCR 12 10/11/2021    K 4.5 10/11/2021    CO2 23 10/11/2021    CALCIUM 9.5 10/11/2021    PROTENTOTREF 6.5 10/11/2021    ALBUMIN 4.3 10/11/2021    LABIL2 2.0 10/11/2021    AST 16 10/11/2021    ALT 22 10/11/2021     Lab Results   Component Value Date    CHOL  224 (H) 06/09/2020    CHLPL 260 (H) 10/11/2021    TRIG 161 (H) 10/11/2021    HDL 45 10/11/2021     (H) 10/11/2021     Lab Results   Component Value Date    WBC 9.6 10/11/2021    HGB 16.1 (H) 10/11/2021    HCT 47.7 (H) 10/11/2021    MCV 90 10/11/2021     10/11/2021         Imaging:           Medical Tests:             Summary of old records / correspondence / consultant report:           Request outside records:           *Examiner was wearing medical surgical mask, face shield and exam gloves during the entire duration of the visit. Patient was masked the entire time.   Minimum social distance of 6 ft maintained entire visit except if physical contact was necessary as documented.     Dictated utilizing Dragon dictation

## 2022-05-28 LAB
ALBUMIN SERPL-MCNC: 4.3 G/DL (ref 3.8–4.9)
ALBUMIN/GLOB SERPL: 2 {RATIO} (ref 1.2–2.2)
ALP SERPL-CCNC: 83 IU/L (ref 44–121)
ALT SERPL-CCNC: 17 IU/L (ref 0–32)
AST SERPL-CCNC: 13 IU/L (ref 0–40)
BASOPHILS # BLD AUTO: 0.1 X10E3/UL (ref 0–0.2)
BASOPHILS NFR BLD AUTO: 1 %
BILIRUB SERPL-MCNC: 0.2 MG/DL (ref 0–1.2)
BUN SERPL-MCNC: 13 MG/DL (ref 6–24)
BUN/CREAT SERPL: 16 (ref 9–23)
CALCIUM SERPL-MCNC: 9.8 MG/DL (ref 8.7–10.2)
CHLORIDE SERPL-SCNC: 102 MMOL/L (ref 96–106)
CHOLEST SERPL-MCNC: 241 MG/DL (ref 100–199)
CHOLEST/HDLC SERPL: 5.2 RATIO (ref 0–4.4)
CO2 SERPL-SCNC: 23 MMOL/L (ref 20–29)
CREAT SERPL-MCNC: 0.8 MG/DL (ref 0.57–1)
EGFRCR SERPLBLD CKD-EPI 2021: 89 ML/MIN/1.73
EOSINOPHIL # BLD AUTO: 0.3 X10E3/UL (ref 0–0.4)
EOSINOPHIL NFR BLD AUTO: 3 %
ERYTHROCYTE [DISTWIDTH] IN BLOOD BY AUTOMATED COUNT: 13.1 % (ref 11.7–15.4)
GLOBULIN SER CALC-MCNC: 2.1 G/DL (ref 1.5–4.5)
GLUCOSE SERPL-MCNC: 131 MG/DL (ref 65–99)
HBA1C MFR BLD: 6.9 % (ref 4.8–5.6)
HCT VFR BLD AUTO: 45.6 % (ref 34–46.6)
HDLC SERPL-MCNC: 46 MG/DL
HGB BLD-MCNC: 15.5 G/DL (ref 11.1–15.9)
IMM GRANULOCYTES # BLD AUTO: 0 X10E3/UL (ref 0–0.1)
IMM GRANULOCYTES NFR BLD AUTO: 0 %
LDLC SERPL CALC-MCNC: 174 MG/DL (ref 0–99)
LYMPHOCYTES # BLD AUTO: 2.2 X10E3/UL (ref 0.7–3.1)
LYMPHOCYTES NFR BLD AUTO: 23 %
MCH RBC QN AUTO: 30.9 PG (ref 26.6–33)
MCHC RBC AUTO-ENTMCNC: 34 G/DL (ref 31.5–35.7)
MCV RBC AUTO: 91 FL (ref 79–97)
MONOCYTES # BLD AUTO: 0.7 X10E3/UL (ref 0.1–0.9)
MONOCYTES NFR BLD AUTO: 7 %
NEUTROPHILS # BLD AUTO: 6.2 X10E3/UL (ref 1.4–7)
NEUTROPHILS NFR BLD AUTO: 66 %
PLATELET # BLD AUTO: 329 X10E3/UL (ref 150–450)
POTASSIUM SERPL-SCNC: 5.1 MMOL/L (ref 3.5–5.2)
PROT SERPL-MCNC: 6.4 G/DL (ref 6–8.5)
RBC # BLD AUTO: 5.02 X10E6/UL (ref 3.77–5.28)
SODIUM SERPL-SCNC: 138 MMOL/L (ref 134–144)
T4 FREE SERPL-MCNC: 1.29 NG/DL (ref 0.82–1.77)
TRIGL SERPL-MCNC: 119 MG/DL (ref 0–149)
TSH SERPL DL<=0.005 MIU/L-ACNC: 1.04 UIU/ML (ref 0.45–4.5)
VLDLC SERPL CALC-MCNC: 21 MG/DL (ref 5–40)
WBC # BLD AUTO: 9.5 X10E3/UL (ref 3.4–10.8)

## 2022-05-31 DIAGNOSIS — E78.5 HYPERLIPIDEMIA, UNSPECIFIED HYPERLIPIDEMIA TYPE: Primary | ICD-10-CM

## 2022-05-31 RX ORDER — ROSUVASTATIN CALCIUM 5 MG/1
5 TABLET, COATED ORAL DAILY
Qty: 30 TABLET | Refills: 1 | Status: SHIPPED | OUTPATIENT
Start: 2022-05-31 | End: 2022-11-18

## 2022-06-05 LAB — DRUGS UR: NORMAL

## 2022-10-17 DIAGNOSIS — F41.9 ANXIETY: ICD-10-CM

## 2022-10-17 RX ORDER — ESCITALOPRAM OXALATE 20 MG/1
20 TABLET ORAL DAILY
Qty: 90 TABLET | Refills: 1 | Status: SHIPPED | OUTPATIENT
Start: 2022-10-17

## 2022-11-18 ENCOUNTER — OFFICE VISIT (OUTPATIENT)
Dept: INTERNAL MEDICINE | Age: 52
End: 2022-11-18

## 2022-11-18 VITALS
OXYGEN SATURATION: 98 % | WEIGHT: 192.2 LBS | HEIGHT: 63 IN | SYSTOLIC BLOOD PRESSURE: 134 MMHG | TEMPERATURE: 97.1 F | DIASTOLIC BLOOD PRESSURE: 80 MMHG | HEART RATE: 98 BPM | BODY MASS INDEX: 34.05 KG/M2

## 2022-11-18 DIAGNOSIS — K21.9 GASTROESOPHAGEAL REFLUX DISEASE WITHOUT ESOPHAGITIS: ICD-10-CM

## 2022-11-18 DIAGNOSIS — F41.9 ANXIETY: Primary | ICD-10-CM

## 2022-11-18 DIAGNOSIS — E11.9 TYPE 2 DIABETES MELLITUS WITHOUT COMPLICATION, WITHOUT LONG-TERM CURRENT USE OF INSULIN: ICD-10-CM

## 2022-11-18 DIAGNOSIS — F17.210 CONTINUOUS DEPENDENCE ON CIGARETTE SMOKING: ICD-10-CM

## 2022-11-18 DIAGNOSIS — E55.9 VITAMIN D DEFICIENCY: ICD-10-CM

## 2022-11-18 DIAGNOSIS — E78.49 OTHER HYPERLIPIDEMIA: ICD-10-CM

## 2022-11-18 DIAGNOSIS — Z51.81 THERAPEUTIC DRUG MONITORING: ICD-10-CM

## 2022-11-18 DIAGNOSIS — Z79.899 LONG TERM CURRENT USE OF ANTIPSYCHOTIC MEDICATION: ICD-10-CM

## 2022-11-18 PROCEDURE — 99214 OFFICE O/P EST MOD 30 MIN: CPT | Performed by: NURSE PRACTITIONER

## 2022-11-18 RX ORDER — OXAZEPAM 10 MG
10 CAPSULE ORAL DAILY PRN
Qty: 30 CAPSULE | Refills: 0 | Status: SHIPPED | OUTPATIENT
Start: 2022-11-18

## 2022-11-18 NOTE — TELEPHONE ENCOUNTER
Last Ov- 11/18/2022   Nex OV- 3/21/2023  UDS  GIVEN 11/18/2022/PENDING RESULTS  CONTRACT 5/27/2022

## 2022-11-18 NOTE — ASSESSMENT & PLAN NOTE
She was recommended to try red yeast rice as she would not like to start a cholesterol medication. Her 10 year risk for a stroke and/or heart attack was discussed with the patient. She was encouraged to continue trying to eat healthier.

## 2022-11-18 NOTE — PROGRESS NOTES
I N T E R N A L  M E D I C I N E  ZBIGNIEW MCCLELLAND      ENCOUNTER DATE:  11/18/2022    Lucy Hilton / 52 y.o. / female      CHIEF COMPLAINT / REASON FOR OFFICE VISIT     Diabetes, Anxiety, and Hyperlipidemia      ASSESSMENT & PLAN     Problem List Items Addressed This Visit        Cardiac and Vasculature    Hyperlipidemia    Current Assessment & Plan     She was recommended to try red yeast rice as she would not like to start a cholesterol medication. Her 10 year risk for a stroke and/or heart attack was discussed with the patient. She was encouraged to continue trying to eat healthier.            Relevant Orders    Comprehensive Metabolic Panel    Urinalysis With Culture If Indicated - Urine, Clean Catch    Lipid Panel With / Chol / HDL Ratio       Endocrine and Metabolic    Vitamin D deficiency    Current Assessment & Plan     If her levels are low, she will be prescribed vitamin D.         Relevant Orders    Vitamin D,25-Hydroxy    Type 2 diabetes mellitus without complication, without long-term current use of insulin (HCC)    Current Assessment & Plan     If her hemoglobin A1c is higher than 7, we will add Glyburide.         Relevant Medications    metFORMIN (GLUCOPHAGE) 1000 MG tablet    Other Relevant Orders    Comprehensive Metabolic Panel    CBC w AUTO Differential    Hemoglobin A1c    Urinalysis With Culture If Indicated - Urine, Clean Catch    Microalbumin / Creatinine Urine Ratio - Urine, Clean Catch       Gastrointestinal Abdominal     Gastroesophageal reflux disease    Relevant Medications    esomeprazole (nexIUM) 20 MG capsule       Health Encounters    Therapeutic drug monitoring    Relevant Orders    Compliance Drug Analysis, Ur - Urine, Clean Catch       Mental Health    Anxiety - Primary    Relevant Medications    escitalopram (LEXAPRO) 20 MG tablet    Other Relevant Orders    Compliance Drug Analysis, Ur - Urine, Clean Catch    Comprehensive Metabolic Panel       Tobacco    Continuous  "dependence on cigarette smoking    Relevant Orders    CBC w AUTO Differential     Orders Placed This Encounter   Procedures   • Compliance Drug Analysis, Ur - Urine, Clean Catch   • Vitamin D,25-Hydroxy   • Comprehensive Metabolic Panel   • Hemoglobin A1c   • Urinalysis With Culture If Indicated - Urine, Clean Catch   • Lipid Panel With / Chol / HDL Ratio   • Microalbumin / Creatinine Urine Ratio - Urine, Clean Catch   • CBC w AUTO Differential     No orders of the defined types were placed in this encounter.    Health Maintenance:   -Her health aintenace screenings will be postponed for now as per patient's request.       SUMMARY/DISCUSSION  · She will follow up in 4 months or sooner if needed.    Next Appointment with me: Visit date not found    Return in about 4 months (around 3/18/2023) for Next scheduled follow up.      VITAL SIGNS     Visit Vitals  /80   Pulse 98   Temp 97.1 °F (36.2 °C) (Temporal)   Ht 160 cm (63\")   Wt 87.2 kg (192 lb 3.2 oz)   LMP  (LMP Unknown)   SpO2 98%   BMI 34.05 kg/m²       Wt Readings from Last 3 Encounters:   11/18/22 87.2 kg (192 lb 3.2 oz)   05/27/22 86.5 kg (190 lb 12.8 oz)   04/18/22 83.9 kg (185 lb)     Body mass index is 34.05 kg/m².      MEDICATIONS AT THE TIME OF OFFICE VISIT     Current Outpatient Medications on File Prior to Visit   Medication Sig   • escitalopram (LEXAPRO) 20 MG tablet Take 1 tablet by mouth Daily.   • esomeprazole (nexIUM) 20 MG capsule Take 1 capsule by mouth Daily.   • metFORMIN (GLUCOPHAGE) 1000 MG tablet Take 1 tablet by mouth Daily With Breakfast.   • [DISCONTINUED] oxazepam (SERAX) 10 MG capsule Take 1 capsule by mouth Daily As Needed for Sleep.   • [DISCONTINUED] meclizine (ANTIVERT) 25 MG tablet Take 1 tablet by mouth 3 (Three) Times a Day As Needed for Dizziness.   • [DISCONTINUED] rosuvastatin (Crestor) 5 MG tablet Take 1 tablet by mouth Daily.   • [DISCONTINUED] SITagliptin (Januvia) 100 MG tablet Take 1 tablet by mouth Daily.     No " current facility-administered medications on file prior to visit.          HISTORY OF PRESENT ILLNESS     The patient presents for her 6-month chronic medical follow-up evaluation of hyperlipidemia, type 2 diabetes, elevated hemoglobin, vitamin D deficiency, GERD and anxiety.    Recent life events:  She states that her 18-year-old daughter got into a significant car accident on 09/06/2022 and sustained several fractures which required surgery. She states that it has been traumatic for her and her family. She also reports that her  has terminal cancer with unsuccessful treatment and was told he has approximately 3 to 6 more months left. She is also in the process of trying to get her home ready to sell. She adds that it has been tough for her as she has been trying to stay strong for her  and her girls. She states that her sister has been someone she has been talking to as she is a home health nurse and has been supportive.    Hyperlipidemia:  She reports that she has not started the Crestor. She states that with what is going on in her life now, she is worried about any possible side effects that may hinder her ability to support her family during this time. She adds that she typically does not like taking new medicines. She states that financially, eating healthy is difficult for her. She has been trying to incorporate fish in her diet.     Type 2 diabetes:  She is taking metformin 1000 mg once a day with breakfast. She states that taking it once a day has been working well for her. She has experienced vomiting when she took a higher dose of the metformin for 2 weeks in the past. She was on Glyburide while she was pregnant and tolerated it well. She has had pancreatitis 2 times while she was pregnant. She denies numbness and tingling in her feet.     Anxiety:  She is taking Lexapro 20 mg. She has Serax 10 mg to take on an as needed basis. She states that she has not needed to take it daily. She  "reports that she took 1 \"the other day\" and 1 yesterday. She adds that she has 2 more tablets left. She reports that she has lost her nails from biting them after breaking the habit for 2 years. She states that she does not see herself quitting smoking as of yet, but adds that she has decreased it from a pack or more per day to 2 quarters of a pack. She has not done the sleep study yet.     GERD:  She is taking Nexium 20 mg for heartburn.    Vitamin D deficiency:   She is not on vitamin D supplements recently. She was previous on 50,000 IU. She states that she does not drink milk. She agrees that if she is prescribed the vitamin D she is more likely to take it regularly.    Health Maintenace:  She reports that she has not had much time for additional appointments. She would like to postpone the colonoscopy, pap smear and lung cancer screening for the time being. She reports she had a pneumonia vaccine in 2021. She received her influenza vaccine at her work. She has had an eye exam recently.     Elevated blood pressure reading:  Her blood pressure is elevated today. She reports it may be due to the stress of driving to her appointment. She reports she checks her blood pressure regularly at home and it typically 117/70 mmHg.         REVIEW OF SYSTEMS     Constitutional negative except per HPI   Respiratory negative  CV negative      PHYSICAL EXAMINATION     Physical Exam  Constitutional  No distress  Cardiovascular Rate  normal . Rhythm: regular . Heart sounds:  normal  Pulmonary/Chest  Effort normal. Breath sounds:  normal  Psychiatric  Alert. Judgment and thought content normal. Mood normal    REVIEWED DATA     Labs:   Lab Results   Component Value Date    GLUCOSE 131 (H) 05/27/2022    BUN 13 05/27/2022    CREATININE 0.80 05/27/2022    EGFRIFNONA 84 10/11/2021    EGFRIFAFRI 97 10/11/2021    BCR 16 05/27/2022    K 5.1 05/27/2022    CO2 23 05/27/2022    CALCIUM 9.8 05/27/2022    PROTENTOTREF 6.4 05/27/2022    ALBUMIN " 4.3 05/27/2022    LABIL2 2.0 05/27/2022    AST 13 05/27/2022    ALT 17 05/27/2022     Lab Results   Component Value Date    CHOL 224 (H) 06/09/2020    CHLPL 241 (H) 05/27/2022    TRIG 119 05/27/2022    HDL 46 05/27/2022     (H) 05/27/2022     Lab Results   Component Value Date    HGBA1C 6.9 (H) 05/27/2022     Lab Results   Component Value Date    TSH 1.040 05/27/2022     Lab Results   Component Value Date    WBC 9.5 05/27/2022    HGB 15.5 05/27/2022    HCT 45.6 05/27/2022    MCV 91 05/27/2022     05/27/2022     Imaging:           Medical Tests:           Summary of old records / correspondence / consultant report:           Request outside records:             *Examiner was wearing medical surgical mask, face shield and exam gloves during the entire duration of the visit. Patient was masked the entire time.

## 2022-11-19 LAB
25(OH)D3+25(OH)D2 SERPL-MCNC: 19.5 NG/ML (ref 30–100)
ALBUMIN SERPL-MCNC: 4.8 G/DL (ref 3.8–4.9)
ALBUMIN/CREAT UR: <18 MG/G CREAT (ref 0–29)
ALBUMIN/GLOB SERPL: 2 {RATIO} (ref 1.2–2.2)
ALP SERPL-CCNC: 99 IU/L (ref 44–121)
ALT SERPL-CCNC: 20 IU/L (ref 0–32)
APPEARANCE UR: CLEAR
AST SERPL-CCNC: 15 IU/L (ref 0–40)
BACTERIA #/AREA URNS HPF: NORMAL /[HPF]
BASOPHILS # BLD AUTO: 0.1 X10E3/UL (ref 0–0.2)
BASOPHILS NFR BLD AUTO: 2 %
BILIRUB SERPL-MCNC: 0.2 MG/DL (ref 0–1.2)
BILIRUB UR QL STRIP: NEGATIVE
BUN SERPL-MCNC: 8 MG/DL (ref 6–24)
BUN/CREAT SERPL: 9 (ref 9–23)
CALCIUM SERPL-MCNC: 10.2 MG/DL (ref 8.7–10.2)
CASTS URNS QL MICRO: NORMAL /LPF
CHLORIDE SERPL-SCNC: 100 MMOL/L (ref 96–106)
CHOLEST SERPL-MCNC: 288 MG/DL (ref 100–199)
CHOLEST/HDLC SERPL: 5.1 RATIO (ref 0–4.4)
CO2 SERPL-SCNC: 26 MMOL/L (ref 20–29)
COLOR UR: YELLOW
CREAT SERPL-MCNC: 0.91 MG/DL (ref 0.57–1)
CREAT UR-MCNC: 16.7 MG/DL
EGFRCR SERPLBLD CKD-EPI 2021: 76 ML/MIN/1.73
EOSINOPHIL # BLD AUTO: 0.3 X10E3/UL (ref 0–0.4)
EOSINOPHIL NFR BLD AUTO: 4 %
EPI CELLS #/AREA URNS HPF: NORMAL /HPF (ref 0–10)
ERYTHROCYTE [DISTWIDTH] IN BLOOD BY AUTOMATED COUNT: 13.1 % (ref 11.7–15.4)
GLOBULIN SER CALC-MCNC: 2.4 G/DL (ref 1.5–4.5)
GLUCOSE SERPL-MCNC: 132 MG/DL (ref 70–99)
GLUCOSE UR QL STRIP: NEGATIVE
HBA1C MFR BLD: 6.9 % (ref 4.8–5.6)
HCT VFR BLD AUTO: 50 % (ref 34–46.6)
HDLC SERPL-MCNC: 56 MG/DL
HGB BLD-MCNC: 17 G/DL (ref 11.1–15.9)
HGB UR QL STRIP: NEGATIVE
IMM GRANULOCYTES # BLD AUTO: 0.1 X10E3/UL (ref 0–0.1)
IMM GRANULOCYTES NFR BLD AUTO: 1 %
KETONES UR QL STRIP: NEGATIVE
LABORATORY COMMENT REPORT: ABNORMAL
LDLC SERPL CALC-MCNC: 206 MG/DL (ref 0–99)
LEUKOCYTE ESTERASE UR QL STRIP: NEGATIVE
LYMPHOCYTES # BLD AUTO: 2.8 X10E3/UL (ref 0.7–3.1)
LYMPHOCYTES NFR BLD AUTO: 30 %
MCH RBC QN AUTO: 30.9 PG (ref 26.6–33)
MCHC RBC AUTO-ENTMCNC: 34 G/DL (ref 31.5–35.7)
MCV RBC AUTO: 91 FL (ref 79–97)
MICRO URNS: NORMAL
MICRO URNS: NORMAL
MICROALBUMIN UR-MCNC: <3 UG/ML
MONOCYTES # BLD AUTO: 0.7 X10E3/UL (ref 0.1–0.9)
MONOCYTES NFR BLD AUTO: 7 %
NEUTROPHILS # BLD AUTO: 5.6 X10E3/UL (ref 1.4–7)
NEUTROPHILS NFR BLD AUTO: 56 %
NITRITE UR QL STRIP: NEGATIVE
PH UR STRIP: 7.5 [PH] (ref 5–7.5)
PLATELET # BLD AUTO: 362 X10E3/UL (ref 150–450)
POTASSIUM SERPL-SCNC: 5.2 MMOL/L (ref 3.5–5.2)
PROT SERPL-MCNC: 7.2 G/DL (ref 6–8.5)
PROT UR QL STRIP: NEGATIVE
RBC # BLD AUTO: 5.51 X10E6/UL (ref 3.77–5.28)
RBC #/AREA URNS HPF: NORMAL /HPF (ref 0–2)
SODIUM SERPL-SCNC: 140 MMOL/L (ref 134–144)
SP GR UR STRIP: 1 (ref 1–1.03)
TRIGL SERPL-MCNC: 144 MG/DL (ref 0–149)
URINALYSIS REFLEX: NORMAL
UROBILINOGEN UR STRIP-MCNC: 0.2 MG/DL (ref 0.2–1)
VLDLC SERPL CALC-MCNC: 26 MG/DL (ref 5–40)
WBC # BLD AUTO: 9.6 X10E3/UL (ref 3.4–10.8)
WBC #/AREA URNS HPF: NORMAL /HPF (ref 0–5)

## 2022-11-21 RX ORDER — GLYBURIDE 2.5 MG/1
2.5 TABLET ORAL
Qty: 90 TABLET | Refills: 3 | Status: SHIPPED | OUTPATIENT
Start: 2022-11-21

## 2022-11-21 RX ORDER — ERGOCALCIFEROL 1.25 MG/1
50000 CAPSULE ORAL WEEKLY
Qty: 15 CAPSULE | Refills: 1 | Status: SHIPPED | OUTPATIENT
Start: 2022-11-21

## 2022-11-30 LAB — DRUGS UR: NORMAL

## 2023-04-17 DIAGNOSIS — F41.9 ANXIETY: ICD-10-CM

## 2023-04-17 RX ORDER — ESCITALOPRAM OXALATE 20 MG/1
20 TABLET ORAL DAILY
Qty: 90 TABLET | Refills: 1 | Status: SHIPPED | OUTPATIENT
Start: 2023-04-17

## 2023-05-11 DIAGNOSIS — Z79.899 LONG TERM CURRENT USE OF ANTIPSYCHOTIC MEDICATION: ICD-10-CM

## 2023-05-11 RX ORDER — OXAZEPAM 10 MG
10 CAPSULE ORAL DAILY PRN
Qty: 30 CAPSULE | Refills: 0 | Status: SHIPPED | OUTPATIENT
Start: 2023-05-11 | End: 2023-05-11 | Stop reason: SDUPTHER

## 2023-05-11 RX ORDER — OXAZEPAM 10 MG
10 CAPSULE ORAL DAILY PRN
Qty: 30 CAPSULE | Refills: 0 | Status: SHIPPED | OUTPATIENT
Start: 2023-05-11

## 2023-05-11 NOTE — TELEPHONE ENCOUNTER
----- Message from Lucy Hilton sent at 5/11/2023  9:27 AM EDT -----  Regarding: Refill Oxazepam  Contact: [phone]  My  has been in and out of the hospital for the past month. I spent everyday with him at the hospital watching him several times go into respiratory distress. He was finally moved to Palliative care last Wednesday. I was with him amd watch him until he passed away last thursday. Needless to say my anxiety has been thru the roof. Is there any way i can just get a few more oxazepam to hold me over till I see you May 24th for my appt? I am only asking for a few if it is possible just to help me till i have my appt with you.  Thanks  Lucy Hilton

## 2023-05-24 ENCOUNTER — OFFICE VISIT (OUTPATIENT)
Dept: INTERNAL MEDICINE | Age: 53
End: 2023-05-24
Payer: COMMERCIAL

## 2023-05-24 VITALS
HEIGHT: 63 IN | DIASTOLIC BLOOD PRESSURE: 86 MMHG | OXYGEN SATURATION: 97 % | TEMPERATURE: 97.1 F | BODY MASS INDEX: 34.69 KG/M2 | HEART RATE: 100 BPM | WEIGHT: 195.8 LBS | SYSTOLIC BLOOD PRESSURE: 134 MMHG

## 2023-05-24 DIAGNOSIS — F41.9 ANXIETY: ICD-10-CM

## 2023-05-24 DIAGNOSIS — Z51.81 THERAPEUTIC DRUG MONITORING: ICD-10-CM

## 2023-05-24 DIAGNOSIS — E11.9 TYPE 2 DIABETES MELLITUS WITHOUT COMPLICATION, WITHOUT LONG-TERM CURRENT USE OF INSULIN: ICD-10-CM

## 2023-05-24 DIAGNOSIS — Z79.899 LONG TERM CURRENT USE OF ANTIPSYCHOTIC MEDICATION: ICD-10-CM

## 2023-05-24 DIAGNOSIS — K21.9 GASTROESOPHAGEAL REFLUX DISEASE WITHOUT ESOPHAGITIS: ICD-10-CM

## 2023-05-24 DIAGNOSIS — E55.9 VITAMIN D DEFICIENCY: Primary | ICD-10-CM

## 2023-05-24 DIAGNOSIS — E78.49 OTHER HYPERLIPIDEMIA: ICD-10-CM

## 2023-05-24 RX ORDER — OXAZEPAM 10 MG
10 CAPSULE ORAL DAILY PRN
Qty: 30 CAPSULE | Refills: 0
Start: 2023-05-24

## 2023-05-24 NOTE — PROGRESS NOTES
I N T E R N A L  M E D I C I N E  ENDY SHIN, APRN      ENCOUNTER DATE:  05/24/2023    Lucy Hilton / 53 y.o. / female      CHIEF COMPLAINT / REASON FOR OFFICE VISIT     Anxiety, Hyperlipidemia, Heartburn, Vitamin D Deficiency, and Diabetes      ASSESSMENT & PLAN     Problem List Items Addressed This Visit        Cardiac and Vasculature    Hyperlipidemia    Relevant Orders    Lipid Panel With / Chol / HDL Ratio       Endocrine and Metabolic    Type 2 diabetes mellitus without complication, without long-term current use of insulin    Relevant Medications    metFORMIN (GLUCOPHAGE) 1000 MG tablet    Other Relevant Orders    Comprehensive Metabolic Panel    CBC w AUTO Differential    Lipid Panel With / Chol / HDL Ratio    Hemoglobin A1c    TSH+Free T4    Urinalysis With Culture If Indicated - Urine, Clean Catch    Vitamin D deficiency - Primary    Relevant Orders    Vitamin D,25-Hydroxy       Gastrointestinal Abdominal     Gastroesophageal reflux disease    Relevant Medications    esomeprazole (nexIUM) 20 MG capsule       Health Encounters    Therapeutic drug monitoring    Relevant Medications    oxazepam (SERAX) 10 MG capsule    Other Relevant Orders    Drug Screen 11 w/Conf, Serum       Mental Health    Anxiety    Relevant Medications    escitalopram (LEXAPRO) 20 MG tablet    oxazepam (SERAX) 10 MG capsule    Other Relevant Orders    Drug Screen 11 w/Conf, Serum   Other Visit Diagnoses     Long term current use of antipsychotic medication        Relevant Medications    oxazepam (SERAX) 10 MG capsule        Orders Placed This Encounter   Procedures   • Comprehensive Metabolic Panel   • Lipid Panel With / Chol / HDL Ratio   • Hemoglobin A1c   • TSH+Free T4   • Urinalysis With Culture If Indicated - Urine, Clean Catch   • Drug Screen 11 w/Conf, Serum   • Vitamin D,25-Hydroxy   • CBC w AUTO Differential     New Medications Ordered This Visit   Medications   • oxazepam (SERAX) 10 MG capsule     Sig: Take 1  "capsule by mouth Daily As Needed for Sleep.     Dispense:  30 capsule     Refill:  0       SUMMARY/DISCUSSION  • Controlled substance contract signed, Jaison reviewed for Serax, needing daily or every other day with 's recent passing.  • Patient would like to hold off on all vaccinations and health maintenance at this time    Next Appointment with me: Visit date not found    Return in about 4 months (around 9/24/2023) for Next scheduled follow up.      VITAL SIGNS     Visit Vitals  /86   Pulse 100   Temp 97.1 °F (36.2 °C) (Temporal)   Ht 160 cm (63\")   Wt 88.8 kg (195 lb 12.8 oz)   LMP  (LMP Unknown)   SpO2 97%   BMI 34.68 kg/m²     Wt Readings from Last 3 Encounters:   05/24/23 88.8 kg (195 lb 12.8 oz)   04/09/23 88.5 kg (195 lb)   11/18/22 87.2 kg (192 lb 3.2 oz)     Body mass index is 34.68 kg/m².      MEDICATIONS AT THE TIME OF OFFICE VISIT     Current Outpatient Medications on File Prior to Visit   Medication Sig   • escitalopram (LEXAPRO) 20 MG tablet Take 1 tablet by mouth Daily.   • esomeprazole (nexIUM) 20 MG capsule Take 1 capsule by mouth Daily.   • metFORMIN (GLUCOPHAGE) 1000 MG tablet Take 1 tablet by mouth Daily With Breakfast.   • vitamin D (ERGOCALCIFEROL) 1.25 MG (81509 UT) capsule capsule Take 1 capsule by mouth 1 (One) Time Per Week.   • [DISCONTINUED] oxazepam (SERAX) 10 MG capsule Take 1 capsule by mouth Daily As Needed for Sleep.   • [DISCONTINUED] glyburide (DIAbeta) 2.5 MG tablet Take 1 tablet by mouth Daily With Breakfast.   • [DISCONTINUED] predniSONE (DELTASONE) 10 MG (21) dose pack Take by mouth as directed on package   • [DISCONTINUED] promethazine-dextromethorphan (PROMETHAZINE-DM) 6.25-15 MG/5ML syrup Take 5 mL by mouth 4 (Four) Times a Day As Needed for Cough.     No current facility-administered medications on file prior to visit.          HISTORY OF PRESENT ILLNESS     Hyperlipidemia with last LDL in the 170s.  States that she has been eating poorly with fast food and " hospital food due to 's recent hospitalization and testing.  Would like to hold off on medication until she is able to change her dietary and exercise regimen.    Vitamin D has been controlled with 50,000 units weekly.    Type 2 diabetes with last hemoglobin A1c of 6.9, unable to tabulate glyburide due to hypoglycemia episodes.  On metformin 1000 mg twice daily.    GERD controlled with Nexium 20 mg daily with no dysphagia.     Lexapro and rare use of Serax previously for anxiety but she has been using Serax every other day or daily with 's recent passing and financial stressors, plans to decrease as the months progressed.    Continues to smoke, mild elevation in red blood cells in which she declines further evaluation or treatment at this time.    REVIEW OF SYSTEMS     Constitutional neg except per HPI   Resp neg  CV neg  Psych anxious     PHYSICAL EXAMINATION     Physical Exam  Constitutional  No distress  Cardiovascular Rate  normal . Rhythm: regular . Heart sounds:  normal  Pulmonary/Chest  Effort normal. Breath sounds:  normal  Psychiatric  Alert. Judgment and thought content normal. Mood normal     REVIEWED DATA     Labs:   Lab Results   Component Value Date    GLUCOSE 132 (H) 11/18/2022    BUN 8 11/18/2022    CREATININE 0.91 11/18/2022    EGFRRESULT 76 11/18/2022    BCR 9 11/18/2022    K 5.2 11/18/2022    CO2 26 11/18/2022    CALCIUM 10.2 11/18/2022    PROTENTOTREF 7.2 11/18/2022    ALBUMIN 4.8 11/18/2022    BILITOT 0.2 11/18/2022    AST 15 11/18/2022    ALT 20 11/18/2022     Lab Results   Component Value Date    TSH 1.040 05/27/2022     Lab Results   Component Value Date    CHOL 224 (H) 06/09/2020    CHLPL 288 (H) 11/18/2022    TRIG 144 11/18/2022    HDL 56 11/18/2022     (H) 11/18/2022     Lab Results   Component Value Date    HGBA1C 6.9 (H) 11/18/2022     Lab Results   Component Value Date    WBC 9.6 11/18/2022    HGB 17.0 (H) 11/18/2022    HCT 50.0 (H) 11/18/2022    MCV 91 11/18/2022      11/18/2022     Brief Urine Lab Results  (Last result in the past 365 days)      Color   Clarity   Blood   Leuk Est   Nitrite   Protein   CREAT   Urine HCG        11/18/22 0948             16.7         11/18/22 0948 Yellow   Clear   Negative   Negative   Negative   Negative               Imaging:           Medical Tests:           Summary of old records / correspondence / consultant report:           Request outside records:             **Dragon dictation use for documentation.

## 2023-05-28 LAB
25(OH)D3+25(OH)D2 SERPL-MCNC: 24.7 NG/ML (ref 30–100)
ALBUMIN SERPL-MCNC: 4.4 G/DL (ref 3.8–4.9)
ALBUMIN/GLOB SERPL: 1.6 {RATIO} (ref 1.2–2.2)
ALP SERPL-CCNC: 105 IU/L (ref 44–121)
ALT SERPL-CCNC: 16 IU/L (ref 0–32)
AMPHETAMINES SERPL QL SCN: NEGATIVE NG/ML
APPEARANCE UR: CLEAR
AST SERPL-CCNC: 11 IU/L (ref 0–40)
BACTERIA #/AREA URNS HPF: NORMAL /[HPF]
BARBITURATES SERPL QL SCN: NEGATIVE UG/ML
BASOPHILS # BLD AUTO: 0.1 X10E3/UL (ref 0–0.2)
BASOPHILS NFR BLD AUTO: 1 %
BENZODIAZ SERPL QL SCN: NEGATIVE NG/ML
BILIRUB SERPL-MCNC: 0.3 MG/DL (ref 0–1.2)
BILIRUB UR QL STRIP: NEGATIVE
BUN SERPL-MCNC: 8 MG/DL (ref 6–24)
BUN/CREAT SERPL: 9 (ref 9–23)
CALCIUM SERPL-MCNC: 9.8 MG/DL (ref 8.7–10.2)
CANNABINOIDS SERPL QL SCN: NEGATIVE NG/ML
CASTS URNS QL MICRO: NORMAL /LPF
CHLORIDE SERPL-SCNC: 97 MMOL/L (ref 96–106)
CHOLEST SERPL-MCNC: 272 MG/DL (ref 100–199)
CHOLEST/HDLC SERPL: 5.9 RATIO (ref 0–4.4)
CO2 SERPL-SCNC: 24 MMOL/L (ref 20–29)
COCAINE+BZE SERPL QL SCN: NEGATIVE NG/ML
COLOR UR: YELLOW
CREAT SERPL-MCNC: 0.85 MG/DL (ref 0.57–1)
EGFRCR SERPLBLD CKD-EPI 2021: 82 ML/MIN/1.73
EOSINOPHIL # BLD AUTO: 0.3 X10E3/UL (ref 0–0.4)
EOSINOPHIL NFR BLD AUTO: 3 %
EPI CELLS #/AREA URNS HPF: NORMAL /HPF (ref 0–10)
ERYTHROCYTE [DISTWIDTH] IN BLOOD BY AUTOMATED COUNT: 13 % (ref 11.7–15.4)
ETHANOL SERPL-MCNC: NEGATIVE GM/DL
GLOBULIN SER CALC-MCNC: 2.7 G/DL (ref 1.5–4.5)
GLUCOSE SERPL-MCNC: 139 MG/DL (ref 70–99)
GLUCOSE UR QL STRIP: NEGATIVE
HBA1C MFR BLD: 7.4 % (ref 4.8–5.6)
HCT VFR BLD AUTO: 49.4 % (ref 34–46.6)
HDLC SERPL-MCNC: 46 MG/DL
HGB BLD-MCNC: 16.9 G/DL (ref 11.1–15.9)
HGB UR QL STRIP: NEGATIVE
IMM GRANULOCYTES # BLD AUTO: 0 X10E3/UL (ref 0–0.1)
IMM GRANULOCYTES NFR BLD AUTO: 0 %
KETONES UR QL STRIP: NEGATIVE
LDLC SERPL CALC-MCNC: 188 MG/DL (ref 0–99)
LEUKOCYTE ESTERASE UR QL STRIP: NEGATIVE
LYMPHOCYTES # BLD AUTO: 2.2 X10E3/UL (ref 0.7–3.1)
LYMPHOCYTES NFR BLD AUTO: 23 %
MCH RBC QN AUTO: 30.6 PG (ref 26.6–33)
MCHC RBC AUTO-ENTMCNC: 34.2 G/DL (ref 31.5–35.7)
MCV RBC AUTO: 89 FL (ref 79–97)
METHADONE SERPL QL SCN: NEGATIVE NG/ML
MICRO URNS: NORMAL
MICRO URNS: NORMAL
MONOCYTES # BLD AUTO: 0.6 X10E3/UL (ref 0.1–0.9)
MONOCYTES NFR BLD AUTO: 6 %
NEUTROPHILS # BLD AUTO: 6.6 X10E3/UL (ref 1.4–7)
NEUTROPHILS NFR BLD AUTO: 67 %
NITRITE UR QL STRIP: NEGATIVE
OPIATES SERPL QL SCN: NEGATIVE NG/ML
OXYCODONE+OXYMORPHONE SERPLBLD QL SCN: NEGATIVE NG/ML
PCP SERPL QL SCN: NEGATIVE NG/ML
PH UR STRIP: 6.5 [PH] (ref 5–7.5)
PLATELET # BLD AUTO: 383 X10E3/UL (ref 150–450)
POTASSIUM SERPL-SCNC: 4.4 MMOL/L (ref 3.5–5.2)
PROPOXYPH SERPL QL SCN: NEGATIVE NG/ML
PROT SERPL-MCNC: 7.1 G/DL (ref 6–8.5)
PROT UR QL STRIP: NEGATIVE
RBC # BLD AUTO: 5.53 X10E6/UL (ref 3.77–5.28)
RBC #/AREA URNS HPF: NORMAL /HPF (ref 0–2)
SODIUM SERPL-SCNC: 136 MMOL/L (ref 134–144)
SP GR UR STRIP: 1.01 (ref 1–1.03)
T4 FREE SERPL-MCNC: 1.31 NG/DL (ref 0.82–1.77)
TRIGL SERPL-MCNC: 201 MG/DL (ref 0–149)
TSH SERPL DL<=0.005 MIU/L-ACNC: 1.46 UIU/ML (ref 0.45–4.5)
URINALYSIS REFLEX: NORMAL
UROBILINOGEN UR STRIP-MCNC: 0.2 MG/DL (ref 0.2–1)
VLDLC SERPL CALC-MCNC: 38 MG/DL (ref 5–40)
WBC # BLD AUTO: 9.9 X10E3/UL (ref 3.4–10.8)
WBC #/AREA URNS HPF: NORMAL /HPF (ref 0–5)

## 2023-05-30 ENCOUNTER — TELEPHONE (OUTPATIENT)
Dept: INTERNAL MEDICINE | Age: 53
End: 2023-05-30

## 2023-05-30 DIAGNOSIS — E11.65 TYPE 2 DIABETES MELLITUS WITH HYPERGLYCEMIA, WITHOUT LONG-TERM CURRENT USE OF INSULIN: Primary | ICD-10-CM

## 2023-05-30 NOTE — TELEPHONE ENCOUNTER
CRISTIANETVM INSTRUCTING PT TO RETURN CALL TO BE READ LAB RESULTS. LETTER SENT VIA [employer]

## 2023-05-30 NOTE — TELEPHONE ENCOUNTER
----- Message from ZBIGNIEW Manley sent at 5/30/2023  9:21 AM EDT -----  Please call patient.      Continued mild elevation in red blood cells which I know you have declined follow-up or treatment in the recent past due to increased stressors.  Let me know if you change your mind with wanting to quit smoking and need further aid, pulmonary function test to look for COPD, sleep study, or referral to hematology.  Blood sugar is elevated with hemoglobin A1c of 7.4, I know you are unable to tolerate the glyburide due to hypoglycemia or low blood sugar.  I would recommend adding another medication such as Ozempic or Rybelsus, 1 being a weekly injectable with better weight loss results in 1 day and a daily tablet.  Both would help you lose weight and control your sugar.  However they can cause some nausea, stool changes and are contraindicated in medullary thyroid cancer or pancreatitis which I do not see on your chart.  Cholesterol is still significantly elevated, I know you are wanting to hold off on medication right now I would at least recommend improving your diet and adding red yeast rice over-the-counter supplement.    All other labs are in acceptable ranges, let me know how you would like to proceed as I know you are under a lot of stress at this time.

## 2023-08-27 DIAGNOSIS — Z51.81 THERAPEUTIC DRUG MONITORING: ICD-10-CM

## 2023-08-27 DIAGNOSIS — Z79.899 LONG TERM CURRENT USE OF ANTIPSYCHOTIC MEDICATION: ICD-10-CM

## 2023-08-27 DIAGNOSIS — F41.9 ANXIETY: ICD-10-CM

## 2023-08-27 RX ORDER — OXAZEPAM 10 MG
10 CAPSULE ORAL DAILY PRN
Qty: 30 CAPSULE | Refills: 0
Start: 2023-08-27 | End: 2023-08-27 | Stop reason: SDUPTHER

## 2023-08-27 RX ORDER — OXAZEPAM 10 MG
10 CAPSULE ORAL DAILY PRN
Qty: 30 CAPSULE | Refills: 0 | Status: SHIPPED | OUTPATIENT
Start: 2023-08-27

## 2023-08-30 ENCOUNTER — OFFICE VISIT (OUTPATIENT)
Dept: ORTHOPEDIC SURGERY | Facility: CLINIC | Age: 53
End: 2023-08-30
Payer: COMMERCIAL

## 2023-08-30 VITALS
BODY MASS INDEX: 33.66 KG/M2 | SYSTOLIC BLOOD PRESSURE: 133 MMHG | DIASTOLIC BLOOD PRESSURE: 86 MMHG | WEIGHT: 190 LBS | HEIGHT: 63 IN | HEART RATE: 92 BPM

## 2023-08-30 DIAGNOSIS — M77.11 LATERAL EPICONDYLITIS OF RIGHT ELBOW: ICD-10-CM

## 2023-08-30 DIAGNOSIS — M25.521 RIGHT ELBOW PAIN: Primary | ICD-10-CM

## 2023-08-30 RX ADMIN — TRIAMCINOLONE ACETONIDE 40 MG: 40 INJECTION, SUSPENSION INTRA-ARTICULAR; INTRAMUSCULAR at 10:30

## 2023-08-30 RX ADMIN — LIDOCAINE HYDROCHLORIDE 2 ML: 10 INJECTION, SOLUTION INFILTRATION; PERINEURAL at 10:30

## 2023-08-30 NOTE — PROGRESS NOTES
Subjective:     Patient ID: Lucy Hilton is a 53 y.o. female.    Chief Complaint:  Right elbow pain, new patient  History of Present Illness  Lucy Hilton presents to clinic today for evaluation of right lateral elbow pain that has been present for the last 4 to 6 months. It has been particularly worse over the last 4 to 6 weeks, localized to the lateral elbow, and notes some mild radiation down to the anterolateral forearm, worse with resisted wrist and finger activities. She denies any numbness or paresthesia. No fever, chills, or sweats. She denies any overlying skin changes. Mild improvement with soft tissue massage, rest, and activity modification as well as home exercise program, which she has done for greater than 6 weeks.     Social History     Occupational History    Not on file   Tobacco Use    Smoking status: Every Day     Packs/day: 1.00     Years: 35.00     Pack years: 35.00     Types: Cigarettes     Start date: 1986    Smokeless tobacco: Never   Vaping Use    Vaping Use: Former    Substances: Nicotine    Devices: Pre-filled or refillable cartridge   Substance and Sexual Activity    Alcohol use: Yes     Comment: Rarely    Drug use: No    Sexual activity: Yes     Partners: Male      Past Medical History:   Diagnosis Date    Abnormal Pap smear of cervix     Anxiety     Arthritis     Chest pain     R/T GERD - STRESS TEST YRS AGO OK, NO FOLLOW UP NEEDED FROM CARDIO    Colon spasm     GERD (gastroesophageal reflux disease)     History of concussion     Hyperlipidemia     DIET CONTROLLED    PONV (postoperative nausea and vomiting)     Prediabetes     Vertigo      Past Surgical History:   Procedure Laterality Date     SECTION      X2    DILATION AND CURETTAGE, DIAGNOSTIC / THERAPEUTIC      ENDOSCOPY      LEEP N/A 2019    Procedure: LOOP ELECTROCAUTERY EXCISION PROCEDURE AND ENDOMETRIAL BIOPSY;  Surgeon: Rebecca Bravo MD;  Location: [facility];  Service:  "Obstetrics/Gynecology    TONSILLECTOMY      TUBAL ABDOMINAL LIGATION         Family History   Problem Relation Age of Onset    Lung cancer Father     Diabetes Father     Thyroid disease Sister     Other Mother     Anxiety disorder Daughter     Depression Daughter     Cancer Maternal Grandmother     Heart disease Maternal Grandmother     Other Maternal Grandfather     Diabetes Maternal Grandfather     Cancer Paternal Grandmother     Alcohol abuse Paternal Grandfather     Malig Hyperthermia Neg Hx          Review of Systems        Objective:  Vitals:    08/30/23 0933   BP: 133/86   Pulse: 92   Weight: 86.2 kg (190 lb)   Height: 160 cm (63\")         08/30/23  0933   Weight: 86.2 kg (190 lb)     Body mass index is 33.66 kg/m².  Physical Exam    Vital signs reviewed.   General: No acute distress, alert and oriented  Eyes: conjunctiva clear; pupils equally round and reactive  ENT: external ears and nose atraumatic; oropharynx clear  CV: no peripheral edema  Resp: normal respiratory effort  Skin: no rashes or wounds; normal turgor  Psych: mood and affect appropriate; recent and remote memory intact        Ortho Exam     Right Elbow-  Maximal tenderness to palpation over the lateral epicondyle  Mild tenderness into the extensor wad  Maximal increased pain on resisted wrist and finger extension  ROM 0 to 140 degrees  Flexion- 4+/5 strength  Extension- 4+/5 strength  Stable to varus and valgus stress at 0 and 30 degrees  Tinel's over radial tunnel or cubital tunnel- Negative  Positive sensation to light touch all distributions, right hand, symmetric to the left      Imaging:  Right Elbow X-Ray  Indication: Pain  Views: AP and Lateral views    Findings:  No fracture  No bony lesion  Normal soft tissues  Normal joint spaces    No prior studies were available for comparison.    Assessment:        1. Right elbow pain           Plan:  - Medium Joint Arthrocentesis: R elbow on 8/30/2023 10:30 AM  Indications: pain  Details: 22 G " needle, lateral approach  Medications: 40 mg triamcinolone acetonide 40 MG/ML; 2 mL lidocaine 1 %  Outcome: tolerated well, no immediate complications  Procedure, treatment alternatives, risks and benefits explained, specific risks discussed. Consent was given by the patient. Immediately prior to procedure a time out was called to verify the correct patient, procedure, equipment, support staff and site/side marked as required. Patient was prepped and draped in the usual sterile fashion.             1. I discussed treatment options at length with the patient at today's visit. At this point in time, she does appear to have symptoms clinically consistent with lateral epicondylitis. She has failed bracing and oral medication.    2. We discussed options including injection and advanced imaging, she would like to proceed with cortisone injection today.    3. Patient would like to proceed with cortisone injection today to the right elbow lateral epicondyle. Recommended limited use of affected extremity for the next 24 hours to only essential activites other than work on general active and passive motion. Recommended supplementing with ice and soft tissue massage. Discussed with patient that they should see results in 5-7 days, if no improvement in 5-6 weeks I have asked them to call the office to review other options. Patient should call office immediately if they notice redness, warmth, fevers, chills, or residual numbness or tingling for greater than 6 hours after injection.       4. She will continue with soft tissue massage and activity modification as tolerated.    5. She will follow up in 3 months or sooner if needed.      Lucy Hilton was in agreement with plan and had all questions answered.     Orders:  Orders Placed This Encounter   Procedures    - Medium Joint Arthrocentesis: R elbow    XR Elbow 3+ View Right       Medications:  No orders of the defined types were placed in this  encounter.      Followup:  Return in about 3 months (around 11/30/2023).    Diagnoses and all orders for this visit:    1. Right elbow pain (Primary)  -     XR Elbow 3+ View Right    Other orders  -     - Medium Joint Arthrocentesis: R elbow          Dictated utilizing Dragon dictation       Transcribed from ambient dictation for Kiran Valente MD by Darling Garcia.  08/30/23   11:28 EDT    Patient or patient representative verbalized consent to the visit recording.  I have personally performed the services described in this document as transcribed by the above individual, and it is both accurate and complete.

## 2023-09-05 ENCOUNTER — TELEPHONE (OUTPATIENT)
Dept: URGENT CARE | Facility: CLINIC | Age: 53
End: 2023-09-05
Payer: COMMERCIAL

## 2023-09-05 DIAGNOSIS — J32.9 SINUSITIS, UNSPECIFIED CHRONICITY, UNSPECIFIED LOCATION: Primary | ICD-10-CM

## 2023-09-05 RX ORDER — AMOXICILLIN 400 MG/5ML
960 POWDER, FOR SUSPENSION ORAL 2 TIMES DAILY
Qty: 300 ML | Refills: 0 | Status: SHIPPED | OUTPATIENT
Start: 2023-09-05 | End: 2023-09-15

## 2023-09-17 ENCOUNTER — TELEPHONE (OUTPATIENT)
Dept: URGENT CARE | Facility: CLINIC | Age: 53
End: 2023-09-17
Payer: COMMERCIAL

## 2023-09-17 DIAGNOSIS — J06.9 VIRAL UPPER RESPIRATORY TRACT INFECTION: Primary | ICD-10-CM

## 2023-09-17 RX ORDER — IPRATROPIUM BROMIDE 42 UG/1
2 SPRAY, METERED NASAL 4 TIMES DAILY
Qty: 1 EACH | Refills: 0 | Status: SHIPPED | OUTPATIENT
Start: 2023-09-17

## 2023-09-21 RX ORDER — TRIAMCINOLONE ACETONIDE 40 MG/ML
40 INJECTION, SUSPENSION INTRA-ARTICULAR; INTRAMUSCULAR
Status: COMPLETED | OUTPATIENT
Start: 2023-08-30 | End: 2023-08-30

## 2023-09-21 RX ORDER — LIDOCAINE HYDROCHLORIDE 10 MG/ML
2 INJECTION, SOLUTION INFILTRATION; PERINEURAL
Status: COMPLETED | OUTPATIENT
Start: 2023-08-30 | End: 2023-08-30

## 2023-09-28 NOTE — TELEPHONE ENCOUNTER
Last Ov- 5/24/2023   Nex OV- 11/22/2023    Spine appears normal, movement of extremities grossly intact.

## 2023-10-13 DIAGNOSIS — M77.11 LATERAL EPICONDYLITIS OF RIGHT ELBOW: Primary | ICD-10-CM

## 2023-10-16 DIAGNOSIS — F41.9 ANXIETY: ICD-10-CM

## 2023-10-16 RX ORDER — ESCITALOPRAM OXALATE 20 MG/1
20 TABLET ORAL DAILY
Qty: 90 TABLET | Refills: 0 | Status: SHIPPED | OUTPATIENT
Start: 2023-10-16

## 2023-11-07 ENCOUNTER — HOSPITAL ENCOUNTER (OUTPATIENT)
Dept: MRI IMAGING | Facility: HOSPITAL | Age: 53
Discharge: HOME | End: 2023-11-07
Admitting: ORTHOPAEDIC SURGERY
Payer: COMMERCIAL

## 2023-11-07 DIAGNOSIS — M77.11 LATERAL EPICONDYLITIS OF RIGHT ELBOW: ICD-10-CM

## 2023-11-07 PROCEDURE — 73221 MRI JOINT UPR EXTREM W/O DYE: CPT

## 2023-11-13 ENCOUNTER — OFFICE VISIT (OUTPATIENT)
Dept: ORTHOPEDIC SURGERY | Facility: CLINIC | Age: 53
End: 2023-11-13
Payer: COMMERCIAL

## 2023-11-13 VITALS — HEIGHT: 63 IN | WEIGHT: 190 LBS | BODY MASS INDEX: 33.66 KG/M2

## 2023-11-13 DIAGNOSIS — M77.11 LATERAL EPICONDYLITIS OF RIGHT ELBOW: Primary | ICD-10-CM

## 2023-11-13 PROCEDURE — 99213 OFFICE O/P EST LOW 20 MIN: CPT | Performed by: ORTHOPAEDIC SURGERY

## 2023-11-13 NOTE — PROGRESS NOTES
Subjective:     Patient ID: Lucy iHlton is a 53 y.o. female.    Chief Complaint:  Follow up right elbow pain, lateral epicondylitis    History of Present Illness  Lucy Hilton returns to the clinic today on 11/13/2023 for evaluation of right elbow pain. She had little to no injection relief over her elbow and has continued to have pain over the lateral aspect of the elbow. She rates her pain as moderate to severe in intensity, that is aching in nature, with occasional sharp pain, particularly with any lifting, pushing, or pulling activities. She does note some mild radiation down the lateral aspect of her arm, but does not go down to the wrist or hand. She is also noticing some tingling intermittently in her small and ring fingers and has noted some pain over her olecranon bursa recently as well. She has declined previous options for oral steroids. She was using initially a forearm strap for a little bit, but has lost that and is not currently using it at this point.    Social History     Occupational History    Not on file   Tobacco Use    Smoking status: Every Day     Packs/day: 1.00     Years: 35.00     Additional pack years: 0.00     Total pack years: 35.00     Types: Cigarettes     Start date: 1/1/1986    Smokeless tobacco: Never   Vaping Use    Vaping Use: Former    Substances: Nicotine    Devices: Pre-filled or refillable cartridge   Substance and Sexual Activity    Alcohol use: Yes     Comment: Rarely    Drug use: No    Sexual activity: Yes     Partners: Male      Past Medical History:   Diagnosis Date    Abnormal Pap smear of cervix     Anxiety     Arthritis     Chest pain     R/T GERD - STRESS TEST YRS AGO OK, NO FOLLOW UP NEEDED FROM CARDIO    Colon spasm     GERD (gastroesophageal reflux disease)     History of concussion     Hyperlipidemia     DIET CONTROLLED    PONV (postoperative nausea and vomiting)     Prediabetes     Vertigo      Past Surgical History:   Procedure Laterality Date  "    SECTION      X2    DILATION AND CURETTAGE, DIAGNOSTIC / THERAPEUTIC      ENDOSCOPY      LEEP N/A 2019    Procedure: LOOP ELECTROCAUTERY EXCISION PROCEDURE AND ENDOMETRIAL BIOPSY;  Surgeon: Rebecca Bravo MD;  Location: [facility] OR [facility];  Service: Obstetrics/Gynecology    TONSILLECTOMY      TUBAL ABDOMINAL LIGATION         Family History   Problem Relation Age of Onset    Lung cancer Father     Diabetes Father     Thyroid disease Sister     Other Mother     Anxiety disorder Daughter     Depression Daughter     Cancer Maternal Grandmother     Heart disease Maternal Grandmother     Other Maternal Grandfather     Diabetes Maternal Grandfather     Cancer Paternal Grandmother     Alcohol abuse Paternal Grandfather     Malig Hyperthermia Neg Hx          Review of Systems        Objective:  Vitals:    23 0850   Weight: 86.2 kg (190 lb)   Height: 160 cm (63\")         23  0850   Weight: 86.2 kg (190 lb)     Body mass index is 33.66 kg/m².  Physical Exam    Vital signs reviewed.   General: No acute distress, alert and oriented  Eyes: conjunctiva clear; pupils equally round and reactive  ENT: external ears and nose atraumatic; oropharynx clear  CV: no peripheral edema  Resp: normal respiratory effort  Skin: no rashes or wounds; normal turgor  Psych: mood and affect appropriate; recent and remote memory intact        Ortho Exam       Right Elbow:     Maximal tenderness to palpation over the lateral epicondyle  Mild tenderness into the extensor wad  Maximal increased pain on resisted wrist and finger extension  Range of motion is 0 to 140 degrees  Flexion- 4+/5 strength  Extension- 4+/5 strength  Stable to varus and valgus stress at 0 and 30 degrees  Tinel's over radial tunnel or cubital tunnel- Negative  Positive sensation to light touch all distributions, right hand, symmetric to the left         Imaging:    MRI Elbow Right Without Contrast    Result Date: 2023   1. High-grade lateral " epicondylitis with a partial-thickness superficial sided tear of the insertional common extensor tendon, involving greater than 50% of the tendon thickness. 2. Minimal reactive subarticular marrow edema in the dorsal radial aspect of the radial head without evidence of significant overlying articular cartilage loss.    This report was finalized on 11/8/2023 9:17 AM by Dr. Bhupinder Jimenez MD.        Assessment:        1. Lateral epicondylitis of right elbow           Plan:        1. I discussed treatment options at length with patient at today's visit.  2. At this point in time, after reviewing the results from MRI, she would like to proceed with a repeat ultrasound guided injection with cortisone to lateral elbow. We also discussed topical cream, which she has declined. She has also declined oral steroids. She is not interested in PRP due to the financial considerations and can not participate with a postoperative restriction program from a surgical treatment, so she has declined referral to hand and upper extremity.  3. A referral was placed to sports medicine office for injection as noted above.  4. I recommended continued soft tissue massage over the affected site.  5. I recommended trying to get a new forearm strap or find her old one and use it on a more regular basis, particularly with any of those symptoms.  6. I will follow up with her as needed.      Lucy PEGUERO Lida was in agreement with plan and had all questions answered.     Orders:  Orders Placed This Encounter   Procedures    Ambulatory Referral to Sports Medicine       Medications:  No orders of the defined types were placed in this encounter.      Followup:  No follow-ups on file.    Diagnoses and all orders for this visit:    1. Lateral epicondylitis of right elbow (Primary)  -     Ambulatory Referral to Sports Medicine          Transcribed from ambient dictation for Kiran Valente MD by Mayra Hutson.  11/13/23   09:51 EST    Patient or  patient representative verbalized consent to the visit recording.  I have personally performed the services described in this document as transcribed by the above individual, and it is both accurate and complete.

## 2023-11-27 ENCOUNTER — TELEPHONE (OUTPATIENT)
Dept: SPORTS MEDICINE | Facility: CLINIC | Age: 53
End: 2023-11-27

## 2023-11-27 NOTE — TELEPHONE ENCOUNTER
Caller: Lucy Hilton    Relationship to patient: Self    Best call back number: [phone]    Chief complaint: RIGHT ELBOW     Type of visit: US INJECTION     Requested date: ASAP      If rescheduling, when is the original appointment: 11/27/2023     Additional notes:PATIENT WAS SICK AND WISHED TO RESCHEDULE

## 2023-12-01 ENCOUNTER — PROCEDURE VISIT (OUTPATIENT)
Dept: SPORTS MEDICINE | Facility: CLINIC | Age: 53
End: 2023-12-01
Payer: COMMERCIAL

## 2023-12-01 VITALS
OXYGEN SATURATION: 99 % | HEART RATE: 109 BPM | BODY MASS INDEX: 33.31 KG/M2 | WEIGHT: 188 LBS | SYSTOLIC BLOOD PRESSURE: 144 MMHG | HEIGHT: 63 IN | TEMPERATURE: 97.4 F | DIASTOLIC BLOOD PRESSURE: 92 MMHG

## 2023-12-01 DIAGNOSIS — M77.11 LATERAL EPICONDYLITIS OF RIGHT ELBOW: Primary | ICD-10-CM

## 2023-12-01 NOTE — PROGRESS NOTES
Here today for right elbow injection requested by Dr. Valente    - Injection Tendon or Ligament    Date/Time: 12/1/2023 10:39 AM    Performed by: Fadi Nieves MD  Authorized by: Fadi Nieves MD  Consent: Verbal consent obtained. Written consent obtained.  Consent given by: patient  Preparation: Patient was prepped and draped in the usual sterile fashion.  Local anesthesia used: ethyl chloride.    Anesthesia:  Local anesthesia used: ethyl chloride.  Patient tolerance: patient tolerated the procedure well with no immediate complications  Comments: Right common extensor tendon peritenon  Medications administered: 1 mL lidocaine 1 %, 1 mL triamcinolone acetonide 40 MG/ML  Ultrasound guidance: yes        Diagnoses and all orders for this visit:    Lateral epicondylitis of right elbow  -     - Injection Tendon or Ligament

## 2024-01-15 DIAGNOSIS — F41.9 ANXIETY: ICD-10-CM

## 2024-01-15 RX ORDER — ESCITALOPRAM OXALATE 20 MG/1
20 TABLET ORAL DAILY
Qty: 90 TABLET | Refills: 1 | Status: SHIPPED | OUTPATIENT
Start: 2024-01-15

## 2024-01-17 ENCOUNTER — OFFICE VISIT (OUTPATIENT)
Dept: INTERNAL MEDICINE | Age: 54
End: 2024-01-17
Payer: COMMERCIAL

## 2024-01-17 VITALS
DIASTOLIC BLOOD PRESSURE: 88 MMHG | WEIGHT: 184.6 LBS | BODY MASS INDEX: 32.71 KG/M2 | TEMPERATURE: 97.7 F | SYSTOLIC BLOOD PRESSURE: 128 MMHG | HEART RATE: 101 BPM | OXYGEN SATURATION: 98 % | HEIGHT: 63 IN

## 2024-01-17 DIAGNOSIS — E53.8 B12 DEFICIENCY: ICD-10-CM

## 2024-01-17 DIAGNOSIS — K21.9 GASTROESOPHAGEAL REFLUX DISEASE WITHOUT ESOPHAGITIS: ICD-10-CM

## 2024-01-17 DIAGNOSIS — E11.9 TYPE 2 DIABETES MELLITUS WITHOUT COMPLICATION, WITHOUT LONG-TERM CURRENT USE OF INSULIN: ICD-10-CM

## 2024-01-17 DIAGNOSIS — E78.49 OTHER HYPERLIPIDEMIA: Primary | ICD-10-CM

## 2024-01-17 DIAGNOSIS — Z51.81 THERAPEUTIC DRUG MONITORING: ICD-10-CM

## 2024-01-17 DIAGNOSIS — E55.9 VITAMIN D DEFICIENCY: ICD-10-CM

## 2024-01-17 DIAGNOSIS — F41.9 ANXIETY: ICD-10-CM

## 2024-01-17 RX ORDER — ERGOCALCIFEROL 1.25 MG/1
50000 CAPSULE ORAL WEEKLY
Qty: 15 CAPSULE | Refills: 1 | Status: SHIPPED | OUTPATIENT
Start: 2024-01-17

## 2024-01-17 NOTE — PROGRESS NOTES
I N T E R N A L  M E D I C I N E  ENDY SHIN, APRN      ENCOUNTER DATE:  01/17/2024    Lucy Colemanpayam / 53 y.o. / female      CHIEF COMPLAINT / REASON FOR OFFICE VISIT     Diabetes, Hyperlipidemia, Anxiety, and Vitamin D Deficiency      ASSESSMENT & PLAN     Problem List Items Addressed This Visit       Anxiety    Relevant Medications    oxazepam (SERAX) 10 MG capsule    escitalopram (LEXAPRO) 20 MG tablet    Other Relevant Orders    Compliance Drug Analysis, Ur - Urine, Clean Catch    Gastroesophageal reflux disease    Relevant Medications    esomeprazole (nexIUM) 20 MG capsule    Other Relevant Orders    Comprehensive Metabolic Panel    CBC w AUTO Differential    Hyperlipidemia - Primary    Relevant Orders    Lipid Panel With / Chol / HDL Ratio    Comprehensive Metabolic Panel    CBC w AUTO Differential    TSH+Free T4    Vitamin D deficiency    Relevant Medications    vitamin D (ERGOCALCIFEROL) 1.25 MG (96252 UT) capsule capsule    Type 2 diabetes mellitus without complication, without long-term current use of insulin    Relevant Medications    SITagliptin (Januvia) 100 MG tablet    metFORMIN (GLUCOPHAGE) 1000 MG tablet    Other Relevant Orders    Hemoglobin A1c    Comprehensive Metabolic Panel    Microalbumin / Creatinine Urine Ratio - Urine, Clean Catch    Therapeutic drug monitoring    Relevant Medications    oxazepam (SERAX) 10 MG capsule    Other Relevant Orders    Compliance Drug Analysis, Ur - Urine, Clean Catch     Other Visit Diagnoses       B12 deficiency        Relevant Orders    Vitamin B12          Orders Placed This Encounter   Procedures    Compliance Drug Analysis, Ur - Urine, Clean Catch    Vitamin B12    Hemoglobin A1c    Lipid Panel With / Chol / HDL Ratio    Comprehensive Metabolic Panel    TSH+Free T4    Microalbumin / Creatinine Urine Ratio - Urine, Clean Catch    CBC w AUTO Differential     New Medications Ordered This Visit   Medications    vitamin D (ERGOCALCIFEROL) 1.25 MG  "(51142 UT) capsule capsule     Sig: Take 1 capsule by mouth 1 (One) Time Per Week.     Dispense:  15 capsule     Refill:  1       SUMMARY/DISCUSSION  [place] reviewed, controlled substance contract signed and drug screen ordered for Serax  Declines all health screenings  Declines all vaccinations  Declines statin will change from Januvia to GLP-1.  She is determined to try weight loss and exercise first    Next Appointment with me: Visit date not found    Return in about 6 months (around 7/17/2024) for Next scheduled follow up.      VITAL SIGNS     Visit Vitals  /88 (BP Location: Right arm, Patient Position: Sitting, Cuff Size: Adult)   Pulse 101   Temp 97.7 °F (36.5 °C) (Temporal)   Ht 160 cm (63\")   Wt 83.7 kg (184 lb 9.6 oz)   LMP  (LMP Unknown)   SpO2 98%   BMI 32.70 kg/m²     Wt Readings from Last 3 Encounters:   01/17/24 83.7 kg (184 lb 9.6 oz)   12/01/23 85.3 kg (188 lb)   11/13/23 86.2 kg (190 lb)     Body mass index is 32.7 kg/m².      MEDICATIONS AT THE TIME OF OFFICE VISIT     Current Outpatient Medications on File Prior to Visit   Medication Sig    escitalopram (LEXAPRO) 20 MG tablet Take 1 tablet by mouth Daily.    esomeprazole (nexIUM) 20 MG capsule Take 1 capsule by mouth Daily.    metFORMIN (GLUCOPHAGE) 1000 MG tablet Take 1 tablet by mouth Daily With Breakfast.    oxazepam (SERAX) 10 MG capsule Take 1 capsule by mouth Daily As Needed for Sleep.    SITagliptin (Januvia) 100 MG tablet Take 1 tablet by mouth Daily.    [DISCONTINUED] vitamin D (ERGOCALCIFEROL) 1.25 MG (78346 UT) capsule capsule Take 1 capsule by mouth 1 (One) Time Per Week. (Patient not taking: Reported on 1/17/2024)     No current facility-administered medications on file prior to visit.      HISTORY OF PRESENT ILLNESS     Hyperlipidemia with last LDL in the 170s.  States that she has been eating poorly with fast food and hospital food due to 's recent hospitalization and testing.  Would like to hold off on medication until " she is able to change her dietary and exercise regimen.     Vitamin D has been controlled with 50,000 units weekly.     Type 2 diabetes with last hemoglobin A1c of 7.4, unable to tolerate glyburide due to hypoglycemia episodes.  On metformin 1000 mg twice daily.  On Januvia 100 mg daily.  Has tried to improve diet and exercise with at least 11 pound weight loss recently.     GERD controlled with Nexium 20 mg daily with no dysphagia.      Lexapro and rare use of Serax  last refill of thirty tablets in 08/2023.  No panic attack.  No thoughts of suicide or self-harm     Continues to smoke, mild elevation in red blood cells in which she declines further evaluation or treatment at this time. 3/4 pack daily.      REVIEW OF SYSTEMS     Constitutional neg except per HPI   Resp neg  CV neg   Psych stable     PHYSICAL EXAMINATION     Physical Exam  Constitutional  No distress  Cardiovascular Rate  normal . Rhythm: regular . Heart sounds:  normal  Pulmonary/Chest  Effort normal. Breath sounds:  normal  Psychiatric  Alert. Judgment and thought content normal. Mood normal      REVIEWED DATA     Labs:   Lab Results   Component Value Date    GLUCOSE 139 (H) 05/24/2023    BUN 8 05/24/2023    CREATININE 0.85 05/24/2023    EGFRRESULT 82 05/24/2023    BCR 9 05/24/2023    K 4.4 05/24/2023    CO2 24 05/24/2023    CALCIUM 9.8 05/24/2023    PROTENTOTREF 7.1 05/24/2023    ALBUMIN 4.4 05/24/2023    BILITOT 0.3 05/24/2023    AST 11 05/24/2023    ALT 16 05/24/2023     Lab Results   Component Value Date    WBC 9.9 05/24/2023    HGB 16.9 (H) 05/24/2023    HCT 49.4 (H) 05/24/2023    MCV 89 05/24/2023     05/24/2023     Lab Results   Component Value Date    HGBA1C 7.4 (H) 05/24/2023     Lab Results   Component Value Date    TSH 1.460 05/24/2023     Lab Results   Component Value Date    CHOL 224 (H) 06/09/2020    CHLPL 272 (H) 05/24/2023    TRIG 201 (H) 05/24/2023    HDL 46 05/24/2023     (H) 05/24/2023     Brief Urine Lab Results   (Last result in the past 365 days)        Color   Clarity   Blood   Leuk Est   Nitrite   Protein   CREAT   Urine HCG        05/24/23 0914 Yellow   Clear   Negative   Negative   Negative   Negative                 Imaging:           Medical Tests:           Summary of old records / correspondence / consultant report:           Request outside records:           *Dragon dictation used for documentation.

## 2024-01-18 LAB
ALBUMIN SERPL-MCNC: 4.4 G/DL (ref 3.8–4.9)
ALBUMIN/CREAT UR: <9 MG/G CREAT (ref 0–29)
ALBUMIN/GLOB SERPL: 1.9 {RATIO} (ref 1.2–2.2)
ALP SERPL-CCNC: 95 IU/L (ref 44–121)
ALT SERPL-CCNC: 21 IU/L (ref 0–32)
AST SERPL-CCNC: 15 IU/L (ref 0–40)
BASOPHILS # BLD AUTO: 0.1 X10E3/UL (ref 0–0.2)
BASOPHILS NFR BLD AUTO: 1 %
BILIRUB SERPL-MCNC: 0.3 MG/DL (ref 0–1.2)
BUN SERPL-MCNC: 9 MG/DL (ref 6–24)
BUN/CREAT SERPL: 10 (ref 9–23)
CALCIUM SERPL-MCNC: 10.2 MG/DL (ref 8.7–10.2)
CHLORIDE SERPL-SCNC: 100 MMOL/L (ref 96–106)
CHOLEST SERPL-MCNC: 278 MG/DL (ref 100–199)
CHOLEST/HDLC SERPL: 5.2 RATIO (ref 0–4.4)
CO2 SERPL-SCNC: 24 MMOL/L (ref 20–29)
CREAT SERPL-MCNC: 0.86 MG/DL (ref 0.57–1)
CREAT UR-MCNC: 33.4 MG/DL
EGFRCR SERPLBLD CKD-EPI 2021: 81 ML/MIN/1.73
EOSINOPHIL # BLD AUTO: 0.2 X10E3/UL (ref 0–0.4)
EOSINOPHIL NFR BLD AUTO: 2 %
ERYTHROCYTE [DISTWIDTH] IN BLOOD BY AUTOMATED COUNT: 13.3 % (ref 11.7–15.4)
GLOBULIN SER CALC-MCNC: 2.3 G/DL (ref 1.5–4.5)
GLUCOSE SERPL-MCNC: 113 MG/DL (ref 70–99)
HBA1C MFR BLD: 7 % (ref 4.8–5.6)
HCT VFR BLD AUTO: 47.5 % (ref 34–46.6)
HDLC SERPL-MCNC: 53 MG/DL
HGB BLD-MCNC: 15.9 G/DL (ref 11.1–15.9)
IMM GRANULOCYTES # BLD AUTO: 0 X10E3/UL (ref 0–0.1)
IMM GRANULOCYTES NFR BLD AUTO: 0 %
LABORATORY COMMENT REPORT: ABNORMAL
LDLC SERPL CALC-MCNC: 201 MG/DL (ref 0–99)
LYMPHOCYTES # BLD AUTO: 2.4 X10E3/UL (ref 0.7–3.1)
LYMPHOCYTES NFR BLD AUTO: 25 %
MCH RBC QN AUTO: 30.2 PG (ref 26.6–33)
MCHC RBC AUTO-ENTMCNC: 33.5 G/DL (ref 31.5–35.7)
MCV RBC AUTO: 90 FL (ref 79–97)
MICROALBUMIN UR-MCNC: <3 UG/ML
MONOCYTES # BLD AUTO: 0.6 X10E3/UL (ref 0.1–0.9)
MONOCYTES NFR BLD AUTO: 6 %
NEUTROPHILS # BLD AUTO: 6.2 X10E3/UL (ref 1.4–7)
NEUTROPHILS NFR BLD AUTO: 66 %
PLATELET # BLD AUTO: 370 X10E3/UL (ref 150–450)
POTASSIUM SERPL-SCNC: 4.8 MMOL/L (ref 3.5–5.2)
PROT SERPL-MCNC: 6.7 G/DL (ref 6–8.5)
RBC # BLD AUTO: 5.26 X10E6/UL (ref 3.77–5.28)
SODIUM SERPL-SCNC: 139 MMOL/L (ref 134–144)
T4 FREE SERPL-MCNC: 1.25 NG/DL (ref 0.82–1.77)
TRIGL SERPL-MCNC: 131 MG/DL (ref 0–149)
TSH SERPL DL<=0.005 MIU/L-ACNC: 1.28 UIU/ML (ref 0.45–4.5)
VIT B12 SERPL-MCNC: 206 PG/ML (ref 232–1245)
VLDLC SERPL CALC-MCNC: 24 MG/DL (ref 5–40)
WBC # BLD AUTO: 9.5 X10E3/UL (ref 3.4–10.8)

## 2024-01-24 LAB — DRUGS UR: NORMAL

## 2024-01-30 DIAGNOSIS — E11.65 TYPE 2 DIABETES MELLITUS WITH HYPERGLYCEMIA, WITHOUT LONG-TERM CURRENT USE OF INSULIN: ICD-10-CM

## 2024-03-13 DIAGNOSIS — Z79.899 LONG TERM CURRENT USE OF ANTIPSYCHOTIC MEDICATION: ICD-10-CM

## 2024-03-13 DIAGNOSIS — F41.9 ANXIETY: ICD-10-CM

## 2024-03-13 DIAGNOSIS — Z51.81 THERAPEUTIC DRUG MONITORING: ICD-10-CM

## 2024-03-13 RX ORDER — OXAZEPAM 10 MG
10 CAPSULE ORAL DAILY PRN
Qty: 30 CAPSULE | Refills: 0 | Status: SHIPPED | OUTPATIENT
Start: 2024-03-13

## 2024-03-31 ENCOUNTER — APPOINTMENT (OUTPATIENT)
Dept: GENERAL RADIOLOGY | Facility: HOSPITAL | Age: 54
End: 2024-03-31
Payer: COMMERCIAL

## 2024-03-31 ENCOUNTER — HOSPITAL ENCOUNTER (EMERGENCY)
Facility: HOSPITAL | Age: 54
Discharge: HOME | End: 2024-04-01
Attending: EMERGENCY MEDICINE | Admitting: EMERGENCY MEDICINE
Payer: COMMERCIAL

## 2024-03-31 DIAGNOSIS — R07.89 ATYPICAL CHEST PAIN: Primary | ICD-10-CM

## 2024-03-31 LAB
ALBUMIN SERPL-MCNC: 4.4 G/DL (ref 3.5–5.2)
ALBUMIN/GLOB SERPL: 1.8 G/DL
ALP SERPL-CCNC: 93 U/L (ref 39–117)
ALT SERPL W P-5'-P-CCNC: 15 U/L (ref 1–33)
ANION GAP SERPL CALCULATED.3IONS-SCNC: 11.8 MMOL/L (ref 5–15)
AST SERPL-CCNC: 11 U/L (ref 1–32)
BASOPHILS # BLD AUTO: 0.13 10*3/MM3 (ref 0–0.2)
BASOPHILS NFR BLD AUTO: 1 % (ref 0–1.5)
BILIRUB SERPL-MCNC: <0.2 MG/DL (ref 0–1.2)
BUN SERPL-MCNC: 9 MG/DL (ref 6–20)
BUN/CREAT SERPL: 10 (ref 7–25)
CALCIUM SPEC-SCNC: 9.4 MG/DL (ref 8.6–10.5)
CHLORIDE SERPL-SCNC: 101 MMOL/L (ref 98–107)
CO2 SERPL-SCNC: 25.2 MMOL/L (ref 22–29)
CREAT SERPL-MCNC: 0.9 MG/DL (ref 0.57–1)
DEPRECATED RDW RBC AUTO: 43.8 FL (ref 37–54)
EGFRCR SERPLBLD CKD-EPI 2021: 76.1 ML/MIN/1.73
EOSINOPHIL # BLD AUTO: 0.35 10*3/MM3 (ref 0–0.4)
EOSINOPHIL NFR BLD AUTO: 2.8 % (ref 0.3–6.2)
ERYTHROCYTE [DISTWIDTH] IN BLOOD BY AUTOMATED COUNT: 13.4 % (ref 12.3–15.4)
GEN 5 2HR TROPONIN T REFLEX: <6 NG/L
GLOBULIN UR ELPH-MCNC: 2.5 GM/DL
GLUCOSE SERPL-MCNC: 172 MG/DL (ref 65–99)
HCT VFR BLD AUTO: 44.4 % (ref 34–46.6)
HGB BLD-MCNC: 15.2 G/DL (ref 12–15.9)
IMM GRANULOCYTES # BLD AUTO: 0.06 10*3/MM3 (ref 0–0.05)
IMM GRANULOCYTES NFR BLD AUTO: 0.5 % (ref 0–0.5)
LYMPHOCYTES # BLD AUTO: 3.71 10*3/MM3 (ref 0.7–3.1)
LYMPHOCYTES NFR BLD AUTO: 29.4 % (ref 19.6–45.3)
MCH RBC QN AUTO: 30.8 PG (ref 26.6–33)
MCHC RBC AUTO-ENTMCNC: 34.2 G/DL (ref 31.5–35.7)
MCV RBC AUTO: 89.9 FL (ref 79–97)
MONOCYTES # BLD AUTO: 0.92 10*3/MM3 (ref 0.1–0.9)
MONOCYTES NFR BLD AUTO: 7.3 % (ref 5–12)
NEUTROPHILS NFR BLD AUTO: 59 % (ref 42.7–76)
NEUTROPHILS NFR BLD AUTO: 7.45 10*3/MM3 (ref 1.7–7)
NRBC BLD AUTO-RTO: 0 /100 WBC (ref 0–0.2)
PLATELET # BLD AUTO: 372 10*3/MM3 (ref 140–450)
PMV BLD AUTO: 9.7 FL (ref 6–12)
POTASSIUM SERPL-SCNC: 4.3 MMOL/L (ref 3.5–5.2)
PROT SERPL-MCNC: 6.9 G/DL (ref 6–8.5)
RBC # BLD AUTO: 4.94 10*6/MM3 (ref 3.77–5.28)
SODIUM SERPL-SCNC: 138 MMOL/L (ref 136–145)
TROPONIN T DELTA: NORMAL
TROPONIN T SERPL HS-MCNC: <6 NG/L
WBC NRBC COR # BLD AUTO: 12.62 10*3/MM3 (ref 3.4–10.8)

## 2024-03-31 PROCEDURE — 85025 COMPLETE CBC W/AUTO DIFF WBC: CPT

## 2024-03-31 PROCEDURE — 71046 X-RAY EXAM CHEST 2 VIEWS: CPT

## 2024-03-31 PROCEDURE — 93005 ELECTROCARDIOGRAM TRACING: CPT

## 2024-03-31 PROCEDURE — 36415 COLL VENOUS BLD VENIPUNCTURE: CPT

## 2024-03-31 PROCEDURE — 93010 ELECTROCARDIOGRAM REPORT: CPT | Performed by: INTERNAL MEDICINE

## 2024-03-31 PROCEDURE — 99284 EMERGENCY DEPT VISIT MOD MDM: CPT

## 2024-03-31 PROCEDURE — 80053 COMPREHEN METABOLIC PANEL: CPT

## 2024-03-31 PROCEDURE — 84484 ASSAY OF TROPONIN QUANT: CPT

## 2024-03-31 RX ORDER — ASPIRIN 325 MG
325 TABLET ORAL ONCE
Status: DISCONTINUED | OUTPATIENT
Start: 2024-03-31 | End: 2024-03-31

## 2024-03-31 RX ORDER — SODIUM CHLORIDE 0.9 % (FLUSH) 0.9 %
10 SYRINGE (ML) INJECTION AS NEEDED
Status: DISCONTINUED | OUTPATIENT
Start: 2024-03-31 | End: 2024-04-01 | Stop reason: HOSPADM

## 2024-04-01 VITALS
TEMPERATURE: 98.1 F | WEIGHT: 190 LBS | DIASTOLIC BLOOD PRESSURE: 65 MMHG | RESPIRATION RATE: 16 BRPM | OXYGEN SATURATION: 97 % | HEART RATE: 78 BPM | HEIGHT: 63 IN | BODY MASS INDEX: 33.66 KG/M2 | SYSTOLIC BLOOD PRESSURE: 112 MMHG

## 2024-04-01 LAB
QT INTERVAL: 333 MS
QTC INTERVAL: 434 MS

## 2024-04-01 NOTE — ED PROVIDER NOTES
"Subjective     History provided by:  Patient    History of Present Illness    Chief complaint: Chest pain    Location: Substernal nonradiating.    Quality/Severity: \"Feels like someone grabbing me\".    Timing/Onset: Started at 9:00 this morning.  It resolved about an hour after taking a Serax tablet.  Recurred at 5 PM this afternoon.    Modifying Factors: No aggravating or relieving factors.    Associated symptoms: Denies associated shortness of breath, diaphoresis or nausea or vomiting.  No pain in her neck or extremities.    Narrative: The patient is a 54-year-old white female complaining of substernal chest discomfort that is feels like \"someone grabbing me\" she also refers to it as a muscle spasm.  She denies a history of similar pain.  She does report a history of anxiety and panic disorder.  She has a history of hyperlipidemia and type 2 diabetes.  No history of heart disease.  She is a smoker.  Her father had an MI at age 79.    Review of Systems  Past Medical History:   Diagnosis Date    Abnormal Pap smear of cervix     Anxiety     Arthritis     Brain concussion     Chest pain     R/T GERD - STRESS TEST YRS AGO OK, NO FOLLOW UP NEEDED FROM CARDIO    Colon spasm     GERD (gastroesophageal reflux disease)     History of concussion     Hyperlipidemia     DIET CONTROLLED    Obesity     PONV (postoperative nausea and vomiting)     Prediabetes     Vertigo      /92   Pulse 90   Temp 98.1 °F (36.7 °C) (Oral)   Resp 18   Ht 160 cm (63\")   Wt 86.2 kg (190 lb)   LMP  (LMP Unknown)   SpO2 97%   BMI 33.66 kg/m²     Past Medical History:   Diagnosis Date    Abnormal Pap smear of cervix     Anxiety     Arthritis     Brain concussion     Chest pain     R/T GERD - STRESS TEST YRS AGO OK, NO FOLLOW UP NEEDED FROM CARDIO    Colon spasm     GERD (gastroesophageal reflux disease)     History of concussion     Hyperlipidemia     DIET CONTROLLED    Obesity     PONV (postoperative nausea and vomiting)     Prediabetes  "    Vertigo        Allergies   Allergen Reactions    Amoxicillin GI Intolerance     SEVERE VOMITTING    Augmentin [Amoxicillin-Pot Clavulanate] GI Intolerance     SEVERE VOMITTING    Erythromycin GI Intolerance     SEVERE VOMIITING    Glyburide Other (See Comments)     Hypoglycemia     Keflex [Cephalexin] GI Intolerance     SEVERE VOMITTING    Paroxetine Other (See Comments)     FACIAL NUMBNESS    Sertraline Other (See Comments)     TACHYCARDIA    Sulfa Antibiotics GI Intolerance     SEVERE VOMITTING       Past Surgical History:   Procedure Laterality Date     SECTION      X2    DILATION AND CURETTAGE, DIAGNOSTIC / THERAPEUTIC      ENDOSCOPY      LEEP N/A 2019    Procedure: LOOP ELECTROCAUTERY EXCISION PROCEDURE AND ENDOMETRIAL BIOPSY;  Surgeon: Rebecca Bravo MD;  [place] [facility] [place] [facility] [place]  Service: Obstetrics/Gynecology    TONSILLECTOMY      TUBAL ABDOMINAL LIGATION         Family History   Problem Relation Age of Onset    Lung cancer Father     Diabetes Father     Cancer Father         Lung Cancer    Thyroid disease Sister     Diabetes Sister     Other Mother     Anxiety disorder Daughter     Depression Daughter     Cancer Maternal Grandmother     Heart disease Maternal Grandmother     Other Maternal Grandfather     Diabetes Maternal Grandfather     Cancer Paternal Grandmother     Alcohol abuse Paternal Grandfather     Malig Hyperthermia Neg Hx        Social History     Socioeconomic History    Marital status:    Tobacco Use    Smoking status: Every Day     Current packs/day: 1.00     Average packs/day: 1 pack/day for 38.2 years (38.2 ttl pk-yrs)     Types: Cigarettes     Start date: 1986    Smokeless tobacco: Never   Vaping Use    Vaping status: Former    Substances: Nicotine    Devices: Pre-filled or refillable cartridge   Substance and Sexual Activity    Alcohol use: Yes     Comment: Rarely    Drug use: No    Sexual activity: Not Currently     Partners: Male     Birth  control/protection: Post-menopausal, Tubal ligation           Objective   Physical Exam  Vitals and nursing note reviewed.   Constitutional:       General: She is not in acute distress.     Appearance: Normal appearance. She is well-developed. She is obese. She is not ill-appearing, toxic-appearing or diaphoretic.      Comments: Patient appears healthy in no acute distress.  Review of her vital signs: She is afebrile with a temperature 98.1, tachycardic with a heart rate of 109, blood pressure elevated 141/101, respirations normal 18 with a normal room air oxygen saturation of 97%.   HENT:      Head: Normocephalic and atraumatic.      Nose: Nose normal.      Mouth/Throat:      Mouth: Mucous membranes are moist.      Pharynx: Oropharynx is clear. No oropharyngeal exudate.   Eyes:      General: No scleral icterus.        Right eye: No discharge.         Left eye: No discharge.      Pupils: Pupils are equal, round, and reactive to light.   Neck:      Thyroid: No thyromegaly.      Vascular: No JVD.   Cardiovascular:      Rate and Rhythm: Normal rate and regular rhythm.      Heart sounds: Normal heart sounds. No murmur heard.  Pulmonary:      Effort: Pulmonary effort is normal.      Breath sounds: Normal breath sounds. No wheezing, rhonchi or rales.   Chest:      Chest wall: No tenderness.   Abdominal:      General: Bowel sounds are normal. There is no distension.      Palpations: Abdomen is soft.      Tenderness: There is no abdominal tenderness.   Musculoskeletal:         General: No tenderness or deformity. Normal range of motion.      Cervical back: Normal range of motion and neck supple. No tenderness.   Lymphadenopathy:      Cervical: No cervical adenopathy.   Skin:     General: Skin is warm and dry.      Capillary Refill: Capillary refill takes less than 2 seconds.      Findings: No rash.   Neurological:      General: No focal deficit present.      Mental Status: She is alert and oriented to person, place, and  time.      Cranial Nerves: No cranial nerve deficit.      Coordination: Coordination normal.      Comments: No focal motor sensory deficit   Psychiatric:         Mood and Affect: Mood normal.         Behavior: Behavior normal.         Thought Content: Thought content normal.         Judgment: Judgment normal.         Procedures           ED Course  ED Course as of 03/31/24 2338   Sun Mar 31, 2024   2206 My interpretation of the patient's EKG tracing performed 20: 43 is sinus tachycardia with a rate of 102, normal axis, normal conduction, no acute ST segment elevation or depression consistent with ischemia, no ectopy, slight delayed R wave transition, normal NC and QT intervals.  No significant change in comparison to tracing 2/19/2019. [TP]   2220 The patient's blood pressure dropped to 118/92 at 22:01 with a heart rate of 90. [TP]   2333 The patient's delta troponin is negative. [TP]   2333 The patient has atypical chest pain.  Her cardiac workup is negative. [TP]   2337 Patient was counseled to stop smoking. [TP]   2337 The patient's chest x-ray was normal to mine and the radiologist interpretation. [TP]      ED Course User Index  [TP] Frankie Vides MD                HEART Score: 3                              Medical Decision Making  My differential diagnosis for chest pain includes but is not limited to:  Muscle strain, costochondritis, myositis, pleurisy, rib fracture, intercostal neuritis, herpes zoster, tumor, myocardial infarction, coronary syndrome, unstable angina, angina, aortic dissection, mitral valve prolapse, pericarditis, palpitations, pulmonary embolus, pneumonia, pneumothorax, lung cancer, GERD, esophagitis, esophageal spasm     Problems Addressed:  Atypical chest pain: acute illness or injury    Amount and/or Complexity of Data Reviewed  Labs: ordered. Decision-making details documented in ED Course.  Radiology: ordered. Decision-making details documented in ED Course.  ECG/medicine tests:  ordered and independent interpretation performed. Decision-making details documented in ED Course.        Final diagnoses:   Atypical chest pain       ED Disposition  ED Disposition       ED Disposition   Discharge    Condition   Stable    Comment   --               Lucy Francisco MD  [address] [address] [phone]    Schedule an appointment as soon as possible for a visit   next available         Medication List      No changes were made to your prescriptions during this visit.           Labs Reviewed   COMPREHENSIVE METABOLIC PANEL - Abnormal; Notable for the following components:       Result Value    Glucose 172 (*)     All other components within normal limits    Narrative:     GFR Normal >60  Chronic Kidney Disease <60  Kidney Failure <15     CBC WITH AUTO DIFFERENTIAL - Abnormal; Notable for the following components:    WBC 12.62 (*)     Neutrophils, Absolute 7.45 (*)     Lymphocytes, Absolute 3.71 (*)     Monocytes, Absolute 0.92 (*)     Immature Grans, Absolute 0.06 (*)     All other components within normal limits   TROPONIN - Normal    Narrative:     High Sensitive Troponin T Reference Range:  <14.0 ng/L- Negative Female for AMI  <22.0 ng/L- Negative Male for AMI  >=14 - Abnormal Female indicating possible myocardial injury.  >=22 - Abnormal Male indicating possible myocardial injury.   Clinicians would have to utilize clinical acumen, EKG, Troponin, and serial changes to determine if it is an Acute Myocardial Infarction or myocardial injury due to an underlying chronic condition.        HIGH SENSITIVITIY TROPONIN T 2HR    Narrative:     High Sensitive Troponin T Reference Range:  <14.0 ng/L- Negative Female for AMI  <22.0 ng/L- Negative Male for AMI  >=14 - Abnormal Female indicating possible myocardial injury.  >=22 - Abnormal Male indicating possible myocardial injury.   Clinicians would have to utilize clinical acumen, EKG, Troponin, and serial changes to  determine if it is an Acute Myocardial Infarction or myocardial injury due to an underlying chronic condition.        CBC AND DIFFERENTIAL    Narrative:     The following orders were created for panel order CBC & Differential.  Procedure                               Abnormality         Status                     ---------                               -----------         ------                     CBC Auto Differential[[id number]]        Abnormal            Final result                 Please view results for these tests on the individual orders.     XR Chest 2 View   Final Result   Impression:   No acute cardiopulmonary process.         Electronically Signed: Jodi Johns MD     3/31/2024 8:56 PM EDT     Workstation ID: [id number]             Medication List      No changes were made to your prescriptions during this visit.              Frankie Vides MD  03/31/24 1579

## 2024-07-08 DIAGNOSIS — E55.9 VITAMIN D DEFICIENCY: ICD-10-CM

## 2024-07-08 RX ORDER — ERGOCALCIFEROL 1.25 MG/1
50000 CAPSULE ORAL WEEKLY
Qty: 15 CAPSULE | Refills: 1 | Status: SHIPPED | OUTPATIENT
Start: 2024-07-08

## 2024-07-09 DIAGNOSIS — F41.9 ANXIETY: ICD-10-CM

## 2024-07-09 RX ORDER — ESCITALOPRAM OXALATE 20 MG/1
20 TABLET ORAL DAILY
Qty: 90 TABLET | Refills: 1 | Status: SHIPPED | OUTPATIENT
Start: 2024-07-09

## 2024-07-14 DIAGNOSIS — E11.65 TYPE 2 DIABETES MELLITUS WITH HYPERGLYCEMIA, WITHOUT LONG-TERM CURRENT USE OF INSULIN: ICD-10-CM

## 2024-08-04 ENCOUNTER — HOSPITAL ENCOUNTER (EMERGENCY)
Facility: HOSPITAL | Age: 54
Discharge: HOME | End: 2024-08-04
Attending: EMERGENCY MEDICINE | Admitting: EMERGENCY MEDICINE
Payer: COMMERCIAL

## 2024-08-04 VITALS
TEMPERATURE: 98 F | OXYGEN SATURATION: 99 % | DIASTOLIC BLOOD PRESSURE: 85 MMHG | HEIGHT: 63 IN | HEART RATE: 61 BPM | SYSTOLIC BLOOD PRESSURE: 131 MMHG | WEIGHT: 190.04 LBS | RESPIRATION RATE: 16 BRPM | BODY MASS INDEX: 33.67 KG/M2

## 2024-08-04 DIAGNOSIS — R11.2 NAUSEA AND VOMITING, UNSPECIFIED VOMITING TYPE: Primary | ICD-10-CM

## 2024-08-04 DIAGNOSIS — R42 LIGHTHEADED: ICD-10-CM

## 2024-08-04 LAB
ALBUMIN SERPL-MCNC: 4 G/DL (ref 3.5–5.2)
ALBUMIN/GLOB SERPL: 1.7 G/DL
ALP SERPL-CCNC: 89 U/L (ref 39–117)
ALT SERPL W P-5'-P-CCNC: 17 U/L (ref 1–33)
ANION GAP SERPL CALCULATED.3IONS-SCNC: 9.8 MMOL/L (ref 5–15)
AST SERPL-CCNC: 18 U/L (ref 1–32)
BASOPHILS # BLD AUTO: 0.15 10*3/MM3 (ref 0–0.2)
BASOPHILS NFR BLD AUTO: 1.2 % (ref 0–1.5)
BILIRUB SERPL-MCNC: 0.2 MG/DL (ref 0–1.2)
BILIRUB UR QL STRIP: NEGATIVE
BUN SERPL-MCNC: 6 MG/DL (ref 6–20)
BUN/CREAT SERPL: 8.3 (ref 7–25)
CALCIUM SPEC-SCNC: 8.8 MG/DL (ref 8.6–10.5)
CHLORIDE SERPL-SCNC: 104 MMOL/L (ref 98–107)
CLARITY UR: CLEAR
CO2 SERPL-SCNC: 23.2 MMOL/L (ref 22–29)
COLOR UR: YELLOW
CREAT SERPL-MCNC: 0.72 MG/DL (ref 0.57–1)
DEPRECATED RDW RBC AUTO: 45.1 FL (ref 37–54)
EGFRCR SERPLBLD CKD-EPI 2021: 99.5 ML/MIN/1.73
EOSINOPHIL # BLD AUTO: 0.33 10*3/MM3 (ref 0–0.4)
EOSINOPHIL NFR BLD AUTO: 2.6 % (ref 0.3–6.2)
ERYTHROCYTE [DISTWIDTH] IN BLOOD BY AUTOMATED COUNT: 13.3 % (ref 12.3–15.4)
FLUAV SUBTYP SPEC NAA+PROBE: NOT DETECTED
FLUBV RNA ISLT QL NAA+PROBE: NOT DETECTED
GLOBULIN UR ELPH-MCNC: 2.3 GM/DL
GLUCOSE SERPL-MCNC: 129 MG/DL (ref 65–99)
GLUCOSE UR STRIP-MCNC: NEGATIVE MG/DL
HCT VFR BLD AUTO: 45.1 % (ref 34–46.6)
HGB BLD-MCNC: 14.9 G/DL (ref 12–15.9)
HGB UR QL STRIP.AUTO: NEGATIVE
HOLD SPECIMEN: NORMAL
HOLD SPECIMEN: NORMAL
IMM GRANULOCYTES # BLD AUTO: 0.06 10*3/MM3 (ref 0–0.05)
IMM GRANULOCYTES NFR BLD AUTO: 0.5 % (ref 0–0.5)
KETONES UR QL STRIP: NEGATIVE
LEUKOCYTE ESTERASE UR QL STRIP.AUTO: NEGATIVE
LIPASE SERPL-CCNC: 27 U/L (ref 13–60)
LYMPHOCYTES # BLD AUTO: 2.36 10*3/MM3 (ref 0.7–3.1)
LYMPHOCYTES NFR BLD AUTO: 18.9 % (ref 19.6–45.3)
MCH RBC QN AUTO: 30 PG (ref 26.6–33)
MCHC RBC AUTO-ENTMCNC: 33 G/DL (ref 31.5–35.7)
MCV RBC AUTO: 90.9 FL (ref 79–97)
MONOCYTES # BLD AUTO: 0.83 10*3/MM3 (ref 0.1–0.9)
MONOCYTES NFR BLD AUTO: 6.7 % (ref 5–12)
NEUTROPHILS NFR BLD AUTO: 70.1 % (ref 42.7–76)
NEUTROPHILS NFR BLD AUTO: 8.74 10*3/MM3 (ref 1.7–7)
NITRITE UR QL STRIP: NEGATIVE
NRBC BLD AUTO-RTO: 0 /100 WBC (ref 0–0.2)
PH UR STRIP.AUTO: 6 [PH] (ref 5–8)
PLATELET # BLD AUTO: 319 10*3/MM3 (ref 140–450)
PMV BLD AUTO: 9.9 FL (ref 6–12)
POTASSIUM SERPL-SCNC: 3.9 MMOL/L (ref 3.5–5.2)
PROT SERPL-MCNC: 6.3 G/DL (ref 6–8.5)
PROT UR QL STRIP: NEGATIVE
RBC # BLD AUTO: 4.96 10*6/MM3 (ref 3.77–5.28)
SARS-COV-2 RNA RESP QL NAA+PROBE: NOT DETECTED
SODIUM SERPL-SCNC: 137 MMOL/L (ref 136–145)
SP GR UR STRIP: <=1.005 (ref 1–1.03)
UROBILINOGEN UR QL STRIP: NORMAL
WBC NRBC COR # BLD AUTO: 12.47 10*3/MM3 (ref 3.4–10.8)
WHOLE BLOOD HOLD COAG: NORMAL
WHOLE BLOOD HOLD SPECIMEN: NORMAL

## 2024-08-04 PROCEDURE — 99284 EMERGENCY DEPT VISIT MOD MDM: CPT

## 2024-08-04 PROCEDURE — 25010000002 ONDANSETRON PER 1 MG: Performed by: PHYSICIAN ASSISTANT

## 2024-08-04 PROCEDURE — 93005 ELECTROCARDIOGRAM TRACING: CPT

## 2024-08-04 PROCEDURE — 83690 ASSAY OF LIPASE: CPT | Performed by: PHYSICIAN ASSISTANT

## 2024-08-04 PROCEDURE — 36415 COLL VENOUS BLD VENIPUNCTURE: CPT

## 2024-08-04 PROCEDURE — 96374 THER/PROPH/DIAG INJ IV PUSH: CPT

## 2024-08-04 PROCEDURE — 85025 COMPLETE CBC W/AUTO DIFF WBC: CPT | Performed by: EMERGENCY MEDICINE

## 2024-08-04 PROCEDURE — 93005 ELECTROCARDIOGRAM TRACING: CPT | Performed by: EMERGENCY MEDICINE

## 2024-08-04 PROCEDURE — 80053 COMPREHEN METABOLIC PANEL: CPT | Performed by: EMERGENCY MEDICINE

## 2024-08-04 PROCEDURE — 87636 SARSCOV2 & INF A&B AMP PRB: CPT | Performed by: PHYSICIAN ASSISTANT

## 2024-08-04 PROCEDURE — 81003 URINALYSIS AUTO W/O SCOPE: CPT | Performed by: EMERGENCY MEDICINE

## 2024-08-04 RX ORDER — ONDANSETRON 2 MG/ML
4 INJECTION INTRAMUSCULAR; INTRAVENOUS ONCE
Status: COMPLETED | OUTPATIENT
Start: 2024-08-04 | End: 2024-08-04

## 2024-08-04 RX ORDER — ONDANSETRON 4 MG/1
4 TABLET, ORALLY DISINTEGRATING ORAL EVERY 8 HOURS PRN
Qty: 20 TABLET | Refills: 0 | Status: SHIPPED | OUTPATIENT
Start: 2024-08-04

## 2024-08-04 RX ADMIN — ONDANSETRON 4 MG: 2 INJECTION INTRAMUSCULAR; INTRAVENOUS at 07:05

## 2024-08-04 NOTE — ED PROVIDER NOTES
Subjective   History of Present Illness  Chief Complaint: Nausea vomiting near syncope    Patient is a 54-year-old female with history of anxiety, GERD, hyperlipidemia presents to the ER with complaints of near syncopal episode today.  Patient states that she got home from going to the movie theater after eating a lot of popcorn.  She states that she was feeling nauseous and went to the bathroom to vomit, when she got the bathroom she felt like she was going to have diarrhea.  Patient states that she stood up from the toilet to grab something and felt lightheaded and that she can pass out.  She reports no syncopal episode.  No head injury.  She denies any chest pain or shortness of breath.  No new abdominal pain.  Patient states that she has had episodes of intermittent nausea and diarrhea for months but has not yet been seen by GI.    PCP: Dominic Melo    History provided by:  Patient      Review of Systems   Constitutional:  Negative for fever.   HENT:  Negative for sore throat and trouble swallowing.    Eyes: Negative.    Respiratory:  Negative for shortness of breath and wheezing.    Cardiovascular:  Negative for chest pain.   Gastrointestinal:  Positive for diarrhea, nausea and vomiting. Negative for abdominal pain.   Endocrine: Negative.    Genitourinary:  Negative for dysuria.   Skin:  Negative for rash.   Allergic/Immunologic: Negative.    Neurological:  Positive for syncope (Near syncope) and light-headedness. Negative for headaches.   Psychiatric/Behavioral:  Negative for behavioral problems.    All other systems reviewed and are negative.      Past Medical History:   Diagnosis Date    Abnormal Pap smear of cervix     Anxiety     Arthritis     Brain concussion     Chest pain     R/T GERD - STRESS TEST YRS AGO OK, NO FOLLOW UP NEEDED FROM CARDIO    Colon spasm     GERD (gastroesophageal reflux disease)     History of concussion     Hyperlipidemia     DIET CONTROLLED    Obesity     PONV (postoperative nausea  and vomiting)     Prediabetes     Vertigo        Allergies   Allergen Reactions    Amoxicillin GI Intolerance     SEVERE VOMITTING    Augmentin [Amoxicillin-Pot Clavulanate] GI Intolerance     SEVERE VOMITTING    Erythromycin GI Intolerance     SEVERE VOMIITING    Glyburide Other (See Comments)     Hypoglycemia     Keflex [Cephalexin] GI Intolerance     SEVERE VOMITTING    Paroxetine Other (See Comments)     FACIAL NUMBNESS    Sertraline Other (See Comments)     TACHYCARDIA    Sulfa Antibiotics GI Intolerance     SEVERE VOMITTING       Past Surgical History:   Procedure Laterality Date     SECTION      X2    DILATION AND CURETTAGE, DIAGNOSTIC / THERAPEUTIC      ENDOSCOPY      LEEP N/A 2019    Procedure: LOOP ELECTROCAUTERY EXCISION PROCEDURE AND ENDOMETRIAL BIOPSY;  Surgeon: Rebecca Bravo MD;  Location: [facility] [place];  Service: Obstetrics/Gynecology    TONSILLECTOMY      TUBAL ABDOMINAL LIGATION         Family History   Problem Relation Age of Onset    Lung cancer Father     Diabetes Father     Cancer Father         Lung Cancer    Thyroid disease Sister     Diabetes Sister     Other Mother     Anxiety disorder Daughter     Depression Daughter     Cancer Maternal Grandmother     Heart disease Maternal Grandmother     Other Maternal Grandfather     Diabetes Maternal Grandfather     Cancer Paternal Grandmother     Alcohol abuse Paternal Grandfather     Malig Hyperthermia Neg Hx        Social History     Socioeconomic History    Marital status:    Tobacco Use    Smoking status: Every Day     Current packs/day: 1.00     Average packs/day: 1 pack/day for 38.6 years (38.6 ttl pk-yrs)     Types: Cigarettes     Start date: 1986    Smokeless tobacco: Never   Vaping Use    Vaping status: Former    Substances: Nicotine    Devices: Pre-filled or refillable cartridge   Substance and Sexual Activity    Alcohol use: Yes     Comment: Rarely    Drug use: No    Sexual activity: Not Currently     Partners:  "Male     Birth control/protection: Post-menopausal, Tubal ligation           Objective   Physical Exam  Vitals and nursing note reviewed.   Constitutional:       Appearance: Normal appearance. She is well-developed and normal weight. She is not ill-appearing or toxic-appearing.   HENT:      Head: Normocephalic and atraumatic.   Eyes:      Pupils: Pupils are equal, round, and reactive to light.   Cardiovascular:      Rate and Rhythm: Normal rate and regular rhythm.      Pulses: Normal pulses.      Heart sounds: Normal heart sounds. No murmur heard.  Pulmonary:      Effort: Pulmonary effort is normal. No respiratory distress.      Breath sounds: Normal breath sounds. No wheezing.   Abdominal:      General: Bowel sounds are normal. There is no distension.      Palpations: Abdomen is soft.      Tenderness: There is no abdominal tenderness.   Skin:     General: Skin is warm and dry.      Capillary Refill: Capillary refill takes less than 2 seconds.      Findings: No rash.   Neurological:      General: No focal deficit present.      Mental Status: She is alert and oriented to person, place, and time.      Motor: No weakness.   Psychiatric:         Mood and Affect: Mood normal.         Behavior: Behavior normal.         ECG 12 Lead      Date/Time: 8/4/2024 10:28 AM    Performed by: Kylah Lopez PA  Authorized by: John Javier MD  Interpreted by ED physician  Previous ECG: no previous ECG available  Rhythm: sinus rhythm  Rate: normal  BPM: 72  QRS axis: normal  Conduction: conduction normal  Clinical impression: non-specific ECG               ED Course    /85 (Patient Position: Standing)   Pulse 61   Temp 98 °F (36.7 °C) (Oral)   Resp 16   Ht 160 cm (63\")   Wt 86.2 kg (190 lb 0.6 oz)   LMP  (LMP Unknown)   SpO2 99%   BMI 33.66 kg/m²   Labs Reviewed   COMPREHENSIVE METABOLIC PANEL - Abnormal; Notable for the following components:       Result Value    Glucose 129 (*)     All other components within " normal limits    Narrative:     GFR Normal >60  Chronic Kidney Disease <60  Kidney Failure <15     CBC WITH AUTO DIFFERENTIAL - Abnormal; Notable for the following components:    WBC 12.47 (*)     Lymphocyte % 18.9 (*)     Neutrophils, Absolute 8.74 (*)     Immature Grans, Absolute 0.06 (*)     All other components within normal limits   COVID-19 AND FLU A/B, NP SWAB IN TRANSPORT MEDIA 1 HR TAT - Normal    Narrative:     Fact sheet for providers: https://www.fda.gov/media/286080/download    Fact sheet for patients: https://www.fda.gov/media/893600/download    Test performed by PCR.   URINALYSIS W/ CULTURE IF INDICATED - Normal    Narrative:     In absence of clinical symptoms, the presence of pyuria, bacteria, and/or nitrites on the urinalysis result does not correlate with infection.  Urine microscopic not indicated.   LIPASE - Normal   RAINBOW DRAW    Narrative:     The following orders were created for panel order [place] Draw.  Procedure                               Abnormality         Status                     ---------                               -----------         ------                     Green Top (Gel)[[id number]]                                  Final result               Lavender Top[[id number]]                                     Final result               Gold Top - SST[[id number]]                                   Final result               Light Blue Top[[id number]]                                   Final result                 Please view results for these tests on the individual orders.   GREEN TOP   LAVENDER TOP   GOLD TOP - SST   LIGHT BLUE TOP   CBC AND DIFFERENTIAL    Narrative:     The following orders were created for panel order CBC & Differential.  Procedure                               Abnormality         Status                     ---------                               -----------         ------                     CBC Auto Differential[[id number]]        Abnormal            Final  result                 Please view results for these tests on the individual orders.     Medications   ondansetron (ZOFRAN) injection 4 mg (4 mg Intravenous Given 8/4/24 0705)     No radiology results for the last day                                           Medical Decision Making  While in the ED IV was placed and labs were obtained appropriate PPE was worn during exam and throughout all encounters with the patient.  Patient had the above evaluation.  She is afebrile nontoxic appearance in no acute respiratory distress.  She was given Zofran for nausea.  IV fluids were ordered however patient refused, states that she prefers to drink fluids or hydrate at home.  Lab work reassuring.  Mild leukocytosis 12,000 which is likely due to vomiting.  Normal lipase, electrolytes, COVID and flu negative.  Urinalysis negative for UTI.  Patient had benign abdominal exam, CT considered but ultimately not felt warranted at this time.  Patient states that she has had recurring episodes of nausea vomiting diarrhea and occasional occur after she eats.  She states that she is due to follow-up with GI in the next few weeks.  Patient reports feeling better after Zofran and is able to tolerate p.o. without difficulty.  See no indication for admission at this time.  Patient discharged with prescription for Zofran.    Discharge plan and instructions were discussed with the patient who verbalized understanding and is in agreement with the plan, all questions were answered at this time.  Patient is aware of signs symptoms that would require immediate return to the emergency room.  Patient understands importance of following up with primary care provider for further evaluation and worsening concerns as well as blood pressure recheck in the next 4 weeks.    Patient was discharged in improved stable condition with an upright steady gait.    Patient is aware that discharge does not mean that nothing is wrong but indicates no emergencies  present and they must continue care with follow-up as given below or physician of their choice.    Amount and/or Complexity of Data Reviewed  Labs: ordered. Decision-making details documented in ED Course.  ECG/medicine tests: ordered. Decision-making details documented in ED Course.    Risk  Prescription drug management.        Final diagnoses:   Nausea and vomiting, unspecified vomiting type   Lightheaded       ED Disposition  ED Disposition       ED Disposition   Discharge    Condition   Stable    Comment   --               Dominic Melo, DNP, APRN  [address] [address]  [phone]    Schedule an appointment as soon as possible for a visit in 2 days  As needed, If symptoms worsen         Medication List        New Prescriptions      ondansetron ODT 4 MG disintegrating tablet  Commonly known as: ZOFRAN-ODT  Place 1 tablet under the tongue Every 8 (Eight) Hours As Needed for Nausea.               Where to Get Your Medications        These medications were sent to [facility]/pharmacy #[id number] - [place] - [address] - [phone]  - [phone] FX  [address] [place] [address]      Phone: [phone]   ondansetron ODT 4 MG disintegrating tablet            Kylah Lopez PA  08/04/24 1035

## 2024-08-04 NOTE — ED NOTES
Pt arrives to ED after near syncope while having a bowel movement. Pt has no complaints or pain at the time of this Rns assessment. Pt was given Valium by EMS due to anxiety regarding BP cuff. Pt has no anxiety at this time.

## 2024-08-04 NOTE — DISCHARGE INSTRUCTIONS
Take Tylenol as needed for pain.  Zofran as needed for nausea vomiting    Follow-up with primary care for recheck  Strongly recommend following up with GI for reevaluation for persistent nausea and diarrhea    Return to the ER for new or worsening symptoms

## 2024-08-05 ENCOUNTER — OFFICE VISIT (OUTPATIENT)
Dept: INTERNAL MEDICINE | Age: 54
End: 2024-08-05
Payer: COMMERCIAL

## 2024-08-05 VITALS
OXYGEN SATURATION: 98 % | SYSTOLIC BLOOD PRESSURE: 132 MMHG | DIASTOLIC BLOOD PRESSURE: 80 MMHG | BODY MASS INDEX: 33.45 KG/M2 | HEIGHT: 63 IN | WEIGHT: 188.8 LBS | TEMPERATURE: 97.9 F | HEART RATE: 102 BPM

## 2024-08-05 DIAGNOSIS — Z79.899 HIGH RISK MEDICATION USE: ICD-10-CM

## 2024-08-05 DIAGNOSIS — E11.9 TYPE 2 DIABETES MELLITUS WITHOUT COMPLICATION, WITHOUT LONG-TERM CURRENT USE OF INSULIN: ICD-10-CM

## 2024-08-05 DIAGNOSIS — E55.9 VITAMIN D DEFICIENCY: ICD-10-CM

## 2024-08-05 DIAGNOSIS — K21.9 GASTROESOPHAGEAL REFLUX DISEASE WITHOUT ESOPHAGITIS: ICD-10-CM

## 2024-08-05 DIAGNOSIS — Z12.11 SCREEN FOR COLON CANCER: ICD-10-CM

## 2024-08-05 DIAGNOSIS — E78.49 OTHER HYPERLIPIDEMIA: Primary | ICD-10-CM

## 2024-08-05 DIAGNOSIS — F41.9 ANXIETY: ICD-10-CM

## 2024-08-05 LAB
QT INTERVAL: 412 MS
QTC INTERVAL: 452 MS

## 2024-08-05 PROCEDURE — 99214 OFFICE O/P EST MOD 30 MIN: CPT | Performed by: NURSE PRACTITIONER

## 2024-08-05 NOTE — PROGRESS NOTES
No call    I N T E R N A L  M E D I C I N E  ENDY SHIN, ZBIGNIEW      ENCOUNTER DATE:  08/05/2024    Lucy Hilton / 54 y.o. / female      CHIEF COMPLAINT / REASON FOR OFFICE VISIT     Anxiety, Hypertension, Hyperlipidemia, and Diabetes      ASSESSMENT & PLAN     Problem List Items Addressed This Visit       Anxiety    Relevant Medications    oxazepam (SERAX) 10 MG capsule    escitalopram (LEXAPRO) 20 MG tablet    Other Relevant Orders    Compliance Drug Analysis, Ur - Urine, Clean Catch    Gastroesophageal reflux disease    Relevant Medications    esomeprazole (nexIUM) 20 MG capsule    Hyperlipidemia - Primary    Relevant Orders    Lipid Panel With / Chol / HDL Ratio    TSH+Free T4    Vitamin D deficiency    Relevant Medications    vitamin D (ERGOCALCIFEROL) 1.25 MG (57692 UT) capsule capsule    Other Relevant Orders    Vitamin D,25-Hydroxy    Type 2 diabetes mellitus without complication, without long-term current use of insulin    Relevant Medications    SITagliptin (Januvia) 100 MG tablet    metFORMIN (GLUCOPHAGE) 1000 MG tablet    Other Relevant Orders    Hemoglobin A1c     Other Visit Diagnoses       High risk medication use        Relevant Orders    Compliance Drug Analysis, Ur - Urine, Clean Catch    Screen for colon cancer        Relevant Orders    Cologuard - Stool, Per Rectum          Orders Placed This Encounter   Procedures    Lipid Panel With / Chol / HDL Ratio    Hemoglobin A1c    TSH+Free T4    Compliance Drug Analysis, Ur - Urine, Clean Catch    Vitamin D,25-Hydroxy    Cologuard - Stool, Per Rectum     No orders of the defined types were placed in this encounter.      SUMMARY/DISCUSSION  [place] reviewed, controlled substance contract signed for serax.  Drug screen ordered today  Not ready for smoking cessation  Declined CT of the chest  Acceptable to Cologuard.  Declines vaccinations    Next Appointment with me: Visit date not found    Return in about 6 months (around 2/5/2025) for Next  "scheduled follow up.      VITAL SIGNS     Visit Vitals  /80   Pulse 102   Temp 97.9 °F (36.6 °C) (Temporal)   Ht 160 cm (63\")   Wt 85.6 kg (188 lb 12.8 oz)   LMP  (LMP Unknown)   SpO2 98%   BMI 33.44 kg/m²     Wt Readings from Last 3 Encounters:   08/05/24 85.6 kg (188 lb 12.8 oz)   08/04/24 86.2 kg (190 lb 0.6 oz)   03/31/24 86.2 kg (190 lb)     Body mass index is 33.44 kg/m².    MEDICATIONS AT THE TIME OF OFFICE VISIT     Current Outpatient Medications on File Prior to Visit   Medication Sig    escitalopram (LEXAPRO) 20 MG tablet Take 1 tablet by mouth Daily.    esomeprazole (nexIUM) 20 MG capsule Take 1 capsule by mouth Daily.    metFORMIN (GLUCOPHAGE) 1000 MG tablet Take 1 tablet by mouth Daily With Breakfast.    ondansetron ODT (ZOFRAN-ODT) 4 MG disintegrating tablet Place 1 tablet under the tongue Every 8 (Eight) Hours As Needed for Nausea.    oxazepam (SERAX) 10 MG capsule Take 1 capsule by mouth Daily As Needed for Sleep.    SITagliptin (Januvia) 100 MG tablet Take 1 tablet by mouth Daily.    vitamin D (ERGOCALCIFEROL) 1.25 MG (98858 UT) capsule capsule Take 1 capsule by mouth 1 (One) Time Per Week.     No current facility-administered medications on file prior to visit.      HISTORY OF PRESENT ILLNESS     Hyperlipidemia with last LDL in the 200s.  States that she has been eating poorly with fast food and hospital food due to 's recent hospitalization and testing.  Would like to hold off on medication until she is able to change her dietary and exercise regimen.     Vitamin D has been controlled with 50,000 units weekly.  Last vitamin D 24.7.      Type 2 diabetes with last hemoglobin A1c of 7.0, unable to tolerate glyburide due to hypoglycemia episodes.  On metformin 1000 mg twice daily.  On Januvia 100 mg daily.  Has tried to improve diet and exercise with at least 11 pound weight loss recently.     GERD controlled with Nexium 20 mg daily with no dysphagia.      Lexapro and rare use of Serax  " last refill of thirty tablets in 08/2023.  No panic attack.  No thoughts of suicide or self-harm     Continues to smoke, mild elevation in red blood cells in which she declines further evaluation or treatment at this time.  Down to half pack daily.     REVIEW OF SYSTEMS     Constitutional neg except per HPI   Resp neg  CV neg     PHYSICAL EXAMINATION     Physical Exam  Constitutional  No distress  Cardiovascular Rate  normal . Rhythm: regular . Heart sounds:  normal  Pulmonary/Chest  Effort normal. Breath sounds:  normal  Psychiatric  Alert. Judgment and thought content normal. Mood normal      REVIEWED DATA     Labs:   Lab Results   Component Value Date    GLUCOSE 129 (H) 08/04/2024    BUN 6 08/04/2024    CREATININE 0.72 08/04/2024    EGFRRESULT 81 01/17/2024    EGFR 99.5 08/04/2024    BCR 8.3 08/04/2024    K 3.9 08/04/2024    CO2 23.2 08/04/2024    CALCIUM 8.8 08/04/2024    PROTENTOTREF 6.7 01/17/2024    ALBUMIN 4.0 08/04/2024    BILITOT 0.2 08/04/2024    AST 18 08/04/2024    ALT 17 08/04/2024     Lab Results   Component Value Date    WBC 12.47 (H) 08/04/2024    HGB 14.9 08/04/2024    HCT 45.1 08/04/2024    MCV 90.9 08/04/2024     08/04/2024     Lab Results   Component Value Date    CHOL 224 (H) 06/09/2020    CHLPL 278 (H) 01/17/2024    TRIG 131 01/17/2024    HDL 53 01/17/2024     (H) 01/17/2024      Lab Results   Component Value Date    TSH 1.280 01/17/2024     Brief Urine Lab Results  (Last result in the past 365 days)        Color   Clarity   Blood   Leuk Est   Nitrite   Protein   CREAT   Urine HCG        08/04/24 0525 Yellow   Clear   Negative   Negative   Negative   Negative                 Lab Results   Component Value Date    HGBA1C 7.0 (H) 01/17/2024       Imaging:           Medical Tests:           Summary of old records / correspondence / consultant report:           Request outside records:

## 2024-08-12 LAB
25(OH)D3+25(OH)D2 SERPL-MCNC: 24.4 NG/ML (ref 30–100)
CHOLEST SERPL-MCNC: 264 MG/DL (ref 100–199)
CHOLEST/HDLC SERPL: 5 RATIO (ref 0–4.4)
DRUGS UR: NORMAL
HBA1C MFR BLD: 7 % (ref 4.8–5.6)
HDLC SERPL-MCNC: 53 MG/DL
LDLC SERPL CALC-MCNC: 185 MG/DL (ref 0–99)
T4 FREE SERPL-MCNC: 1.38 NG/DL (ref 0.82–1.77)
TRIGL SERPL-MCNC: 142 MG/DL (ref 0–149)
TSH SERPL DL<=0.005 MIU/L-ACNC: 1.18 UIU/ML (ref 0.45–4.5)
VLDLC SERPL CALC-MCNC: 26 MG/DL (ref 5–40)

## 2024-08-23 ENCOUNTER — PATIENT OUTREACH (OUTPATIENT)
Dept: CASE MANAGEMENT | Facility: OTHER | Age: 54
End: 2024-08-23
Payer: COMMERCIAL

## 2024-08-23 NOTE — OUTREACH NOTE
Adult Wellness Assessment  Questions/Answers      Flowsheet Row Most Recent Value   How often do you have someone help you read facts about your health? never   How often do you have trouble learning about your health because you don't know what the written words mean? never   How confident are you filling out forms by yourself? always          Adult Patient Profile  Questions/Answers      Flowsheet Row Most Recent Value   Symptoms/Conditions Managed at Home diabetes, type 2   Diabetes Comment Telephone call with patient.  She reports she no longer has a glucometer.  She shared that her  passed away a year ago, she has sold her home and  has not been compliant with caring for her self with all the things that have gone on in the last year. .  Engaged her in Employee Case Management.  Emailed information on [employer] [facility].   How to be Addressed Lucy   How Well Do You Speak English? very well   Source of Information patient          AMBULATORY CASE MANAGEMENT NOTE    Names and Relationships of Patient/Support Persons: Contact: Lucy Hilton; Relationship: Self  Contact: Lucy Hilton; Relationship: Self -     Patient Outreach  Telephone call with patient.  Discussed diabetes management.  Patient reports she no longer has a blood glucose monitor.  Emailed information on the [employer] [facility].  Emailed information on Employee Case Management, Advance Care Planning, Diabetes, and [employer].  Emailed 24 hour nurse call line number.     No issue with social determinants,denies food, medication, transportation, or financial insecurities. No questions or concerns per pt at this time. Advised pt to call RN-ECM with any new needs. Agrees to follow up outreach.      Education Documentation  No documentation found.        MERI PEGUERO  Ambulatory Case Management    8/23/2024, 13:03 EDT

## 2024-10-01 DIAGNOSIS — F41.9 ANXIETY: ICD-10-CM

## 2024-10-01 DIAGNOSIS — Z51.81 THERAPEUTIC DRUG MONITORING: ICD-10-CM

## 2024-10-01 DIAGNOSIS — Z79.899 LONG TERM CURRENT USE OF ANTIPSYCHOTIC MEDICATION: ICD-10-CM

## 2024-10-01 RX ORDER — OXAZEPAM 10 MG
10 CAPSULE ORAL DAILY PRN
Qty: 30 CAPSULE | Refills: 0 | Status: CANCELLED | OUTPATIENT
Start: 2024-10-01

## 2024-10-02 NOTE — TELEPHONE ENCOUNTER
Spoke with pt. She stated that she doesn't need this medication right. She stated that the pharmacy sent this in.

## 2024-10-12 DIAGNOSIS — F41.9 ANXIETY: ICD-10-CM

## 2024-10-13 RX ORDER — ESCITALOPRAM OXALATE 20 MG/1
20 TABLET ORAL DAILY
Qty: 90 TABLET | Refills: 1 | Status: SHIPPED | OUTPATIENT
Start: 2024-10-13

## 2024-10-31 ENCOUNTER — PATIENT OUTREACH (OUTPATIENT)
Dept: CASE MANAGEMENT | Facility: OTHER | Age: 54
End: 2024-10-31
Payer: COMMERCIAL

## 2024-10-31 NOTE — OUTREACH NOTE
Adult Patient Profile  Questions/Answers      Flowsheet Row Most Recent Value   Symptoms/Conditions Managed at Home diabetes, type 2   Diabetes Comment Reminded patient about the [employer] [facility].  I will send her the contact information. Asked her to contact me if she has any issues signing up.  She can receive a  blood glucose monitor through this program.  She reports she follows a diabetic diet.          AMBULATORY CASE MANAGEMENT NOTE    Names and Relationships of Patient/Support Persons: Contact: Lucy Hilton; Relationship: Self -     Patient Outreach    Telephone call with patient. Discussed diabetes.  Encouraged patient to get a blood glucose monitor.  Emailed RE[employer] information to her.   Education Documentation  No documentation found.        MERI PEGUERO  Ambulatory Case Management    10/31/2024, 16:44 EDT

## 2024-11-20 DIAGNOSIS — Z51.81 THERAPEUTIC DRUG MONITORING: ICD-10-CM

## 2024-11-20 DIAGNOSIS — F41.9 ANXIETY: ICD-10-CM

## 2024-11-20 DIAGNOSIS — Z79.899 LONG TERM CURRENT USE OF ANTIPSYCHOTIC MEDICATION: ICD-10-CM

## 2024-11-21 RX ORDER — OXAZEPAM 10 MG
10 CAPSULE ORAL DAILY PRN
Qty: 30 CAPSULE | Refills: 0 | Status: SHIPPED | OUTPATIENT
Start: 2024-11-21

## 2025-01-07 RX ORDER — AMOXICILLIN 250 MG/5ML
500 POWDER, FOR SUSPENSION ORAL 2 TIMES DAILY
Qty: 200 ML | Refills: 0 | Status: SHIPPED | OUTPATIENT
Start: 2025-01-07 | End: 2025-01-17

## 2025-02-10 ENCOUNTER — OFFICE VISIT (OUTPATIENT)
Dept: INTERNAL MEDICINE | Age: 55
End: 2025-02-10
Payer: COMMERCIAL

## 2025-02-10 VITALS
OXYGEN SATURATION: 97 % | SYSTOLIC BLOOD PRESSURE: 160 MMHG | WEIGHT: 185.6 LBS | BODY MASS INDEX: 32.89 KG/M2 | HEART RATE: 90 BPM | TEMPERATURE: 97.7 F | HEIGHT: 63 IN | DIASTOLIC BLOOD PRESSURE: 100 MMHG

## 2025-02-10 DIAGNOSIS — E11.9 TYPE 2 DIABETES MELLITUS WITHOUT COMPLICATION, WITHOUT LONG-TERM CURRENT USE OF INSULIN: Primary | ICD-10-CM

## 2025-02-10 DIAGNOSIS — F41.9 ANXIETY: ICD-10-CM

## 2025-02-10 DIAGNOSIS — Z79.899 HIGH RISK MEDICATION USE: ICD-10-CM

## 2025-02-10 DIAGNOSIS — Z12.31 ENCOUNTER FOR SCREENING MAMMOGRAM FOR MALIGNANT NEOPLASM OF BREAST: ICD-10-CM

## 2025-02-10 DIAGNOSIS — E55.9 VITAMIN D DEFICIENCY: ICD-10-CM

## 2025-02-10 DIAGNOSIS — E78.5 HYPERLIPIDEMIA, UNSPECIFIED HYPERLIPIDEMIA TYPE: ICD-10-CM

## 2025-02-10 PROCEDURE — 99214 OFFICE O/P EST MOD 30 MIN: CPT | Performed by: NURSE PRACTITIONER

## 2025-02-10 RX ORDER — ATORVASTATIN CALCIUM 10 MG/1
10 TABLET, FILM COATED ORAL DAILY
Qty: 90 TABLET | Refills: 1 | Status: SHIPPED | OUTPATIENT
Start: 2025-02-10

## 2025-02-10 RX ORDER — ERGOCALCIFEROL 1.25 MG/1
50000 CAPSULE, LIQUID FILLED ORAL WEEKLY
Qty: 15 CAPSULE | Refills: 1 | Status: SHIPPED | OUTPATIENT
Start: 2025-02-10

## 2025-02-10 NOTE — PROGRESS NOTES
I N T E R N A L  M E D I C I N E  [facility]      ENCOUNTER DATE:  02/10/2025    Lucy Colemanpayam / 54 y.o. / female      CHIEF COMPLAINT / REASON FOR OFFICE VISIT     Anxiety, Diabetes, and Hyperlipidemia      ASSESSMENT & PLAN     Problem List Items Addressed This Visit       Anxiety    Relevant Medications    escitalopram (LEXAPRO) 20 MG tablet    oxazepam (SERAX) 10 MG capsule    Other Relevant Orders    Compliance Drug Analysis, Ur - Urine, Clean Catch    Hyperlipidemia    Relevant Medications    atorvastatin (LIPITOR) 10 MG tablet    Other Relevant Orders    Comprehensive Metabolic Panel    Lipid Panel With / Chol / HDL Ratio    Vitamin D deficiency    Relevant Medications    vitamin D (ERGOCALCIFEROL) 1.25 MG (18776 UT) capsule capsule    Type 2 diabetes mellitus without complication, without long-term current use of insulin - Primary    Relevant Medications    metFORMIN (GLUCOPHAGE) 1000 MG tablet    empagliflozin (Jardiance) 25 MG tablet tablet    Other Relevant Orders    Hemoglobin A1c    CBC w AUTO Differential    Microalbumin / Creatinine Urine Ratio - Urine, Clean Catch     Other Visit Diagnoses       Encounter for screening mammogram for malignant neoplasm of breast        High risk medication use        Relevant Orders    Compliance Drug Analysis, Ur - Urine, Clean Catch          Orders Placed This Encounter   Procedures    Comprehensive Metabolic Panel    Lipid Panel With / Chol / HDL Ratio    Hemoglobin A1c    Compliance Drug Analysis, Ur - Urine, Clean Catch    Microalbumin / Creatinine Urine Ratio - Urine, Clean Catch    CBC w AUTO Differential     New Medications Ordered This Visit   Medications    empagliflozin (Jardiance) 25 MG tablet tablet     Sig: Take 1 tablet by mouth Every Morning Before Breakfast.     Dispense:  90 tablet     Refill:  1    vitamin D (ERGOCALCIFEROL) 1.25 MG (44033 UT) capsule capsule     Sig: Take 1 capsule by mouth 1 (One) Time Per Week.     Dispense:  15  "capsule     Refill:  1    atorvastatin (LIPITOR) 10 MG tablet     Sig: Take 1 tablet by mouth Daily.     Dispense:  90 tablet     Refill:  1       SUMMARY/DISCUSSION  Controlled substance contract signed, Jaison viewed for Serax.  Drug screen ordered.  Januvia causes her GI upset we will switch over to Jardiance 25 mg daily. 2 weeks of samples given so that we can complete a prior authorization  Patient is willing to trial low-dose of statin to call with any myalgias.  Restart vitamin D supplement along with 2000 mcg vitamin B12 daily      Next Appointment with me: Visit date not found    Return in about 6 months (around 8/10/2025) for Next scheduled follow up.      VITAL SIGNS     Visit Vitals  /100   Pulse 90   Temp 97.7 °F (36.5 °C)   Ht 160 cm (63\")   Wt 84.2 kg (185 lb 9.6 oz)   LMP  (LMP Unknown)   SpO2 97%   BMI 32.88 kg/m²       Wt Readings from Last 3 Encounters:   02/10/25 84.2 kg (185 lb 9.6 oz)   01/03/25 81.6 kg (180 lb)   08/05/24 85.6 kg (188 lb 12.8 oz)     Body mass index is 32.88 kg/m².      MEDICATIONS AT THE TIME OF OFFICE VISIT     Current Outpatient Medications on File Prior to Visit   Medication Sig    escitalopram (LEXAPRO) 20 MG tablet Take 1 tablet by mouth Daily.    esomeprazole (nexIUM) 20 MG capsule Take 1 capsule by mouth Daily.    metFORMIN (GLUCOPHAGE) 1000 MG tablet Take 1 tablet by mouth Daily With Breakfast.    oxazepam (SERAX) 10 MG capsule Take 1 capsule by mouth Daily As Needed for Sleep.    [DISCONTINUED] escitalopram (LEXAPRO) 5 MG tablet Take 1 tablet by mouth once a day (Patient not taking: Reported on 2/10/2025)    [DISCONTINUED] hydrOXYzine (ATARAX) 10 MG tablet Take 1 tablet by mouth up to 2 times a day as needed for anxiety (Patient not taking: Reported on 2/10/2025)    [DISCONTINUED] ondansetron ODT (ZOFRAN-ODT) 4 MG disintegrating tablet Place 1 tablet under the tongue Every 8 (Eight) Hours As Needed for Nausea. (Patient not taking: Reported on 2/10/2025)    " [DISCONTINUED] SITagliptin (Januvia) 100 MG tablet Take 1 tablet by mouth Daily. (Patient not taking: Reported on 2/10/2025)    [DISCONTINUED] vitamin D (ERGOCALCIFEROL) 1.25 MG (77663 UT) capsule capsule Take 1 capsule by mouth 1 (One) Time Per Week. (Patient not taking: Reported on 2/10/2025)     No current facility-administered medications on file prior to visit.          HISTORY OF PRESENT ILLNESS     Hyperlipidemia with last LDL in the 180s.  Historically she has declined statin but is willing to pursue at this time.     Vitamin D has been controlled with 50,000 units weekly.  Has not been on her vitamin D supplement.  B12 deficiency has not been on supplement.     Type 2 diabetes with last hemoglobin A1c of 7.0, unable to tolerate glyburide due to hypoglycemia episodes.  On metformin 1000 mg twice daily.  On Januvia 100 mg daily but has also not been taking this as it is caused her GI upset.  Has tried to improve diet and exercise with at least 11 pound weight loss recently.     GERD controlled with Nexium 20 mg daily with no dysphagia.      Lexapro and rare use of Serax. No panic attack.  No thoughts of suicide or self-harm     Continues to smoke, mild elevation in red blood cells in which she declines further evaluation or treatment at this time.  Down to half pack daily.     Declines mammogram up-to-date.    REVIEW OF SYSTEMS     Constitutional neg except per HPI   Resp neg  CV neg   Psych anxious      PHYSICAL EXAMINATION     Physical Exam  Constitutional  No distress  Cardiovascular Rate  normal . Rhythm: regular . Heart sounds:  normal  Pulmonary/Chest  Effort normal. Breath sounds:  normal  Psychiatric  Alert. Judgment and thought content normal. Mood normal      REVIEWED DATA     Labs:   Lab Results   Component Value Date    GLUCOSE 129 (H) 08/04/2024    BUN 6 08/04/2024    CREATININE 0.72 08/04/2024    EGFRRESULT 81 01/17/2024    EGFR 99.5 08/04/2024    BCR 8.3 08/04/2024    K 3.9 08/04/2024    CO2  23.2 08/04/2024    CALCIUM 8.8 08/04/2024    PROTENTOTREF 6.7 01/17/2024    ALBUMIN 4.0 08/04/2024    BILITOT 0.2 08/04/2024    AST 18 08/04/2024    ALT 17 08/04/2024     Lab Results   Component Value Date    WBC 12.47 (H) 08/04/2024    HGB 14.9 08/04/2024    HCT 45.1 08/04/2024    MCV 90.9 08/04/2024     08/04/2024     Lab Results   Component Value Date    CHOL 224 (H) 06/09/2020    CHLPL 264 (H) 08/05/2024    TRIG 142 08/05/2024    HDL 53 08/05/2024     (H) 08/05/2024      Lab Results   Component Value Date    TSH 1.180 08/05/2024     Brief Urine Lab Results  (Last result in the past 365 days)        Color   Clarity   Blood   Leuk Est   Nitrite   Protein   CREAT   Urine HCG        08/04/24 0525 Yellow   Clear   Negative   Negative   Negative   Negative                 Lab Results   Component Value Date    HGBA1C 7.0 (H) 08/05/2024       Imaging:           Medical Tests:           Summary of old records / correspondence / consultant report:           Request outside records:

## 2025-02-11 LAB
ALBUMIN SERPL-MCNC: 4.2 G/DL (ref 3.8–4.9)
ALBUMIN/CREAT UR: <12 MG/G CREAT (ref 0–29)
ALP SERPL-CCNC: 103 IU/L (ref 44–121)
ALT SERPL-CCNC: 15 IU/L (ref 0–32)
AST SERPL-CCNC: 10 IU/L (ref 0–40)
BASOPHILS # BLD AUTO: 0.1 X10E3/UL (ref 0–0.2)
BASOPHILS NFR BLD AUTO: 1 %
BILIRUB SERPL-MCNC: 0.2 MG/DL (ref 0–1.2)
BUN SERPL-MCNC: 11 MG/DL (ref 6–24)
BUN/CREAT SERPL: 13 (ref 9–23)
CALCIUM SERPL-MCNC: 10.1 MG/DL (ref 8.7–10.2)
CHLORIDE SERPL-SCNC: 100 MMOL/L (ref 96–106)
CHOLEST SERPL-MCNC: 269 MG/DL (ref 100–199)
CHOLEST/HDLC SERPL: 5.8 RATIO (ref 0–4.4)
CO2 SERPL-SCNC: 23 MMOL/L (ref 20–29)
CREAT SERPL-MCNC: 0.84 MG/DL (ref 0.57–1)
CREAT UR-MCNC: 25.6 MG/DL
EGFRCR SERPLBLD CKD-EPI 2021: 83 ML/MIN/1.73
EOSINOPHIL # BLD AUTO: 0.4 X10E3/UL (ref 0–0.4)
EOSINOPHIL NFR BLD AUTO: 4 %
ERYTHROCYTE [DISTWIDTH] IN BLOOD BY AUTOMATED COUNT: 13 % (ref 11.7–15.4)
GLOBULIN SER CALC-MCNC: 2.4 G/DL (ref 1.5–4.5)
GLUCOSE SERPL-MCNC: 135 MG/DL (ref 70–99)
HBA1C MFR BLD: 7.5 % (ref 4.8–5.6)
HCT VFR BLD AUTO: 47.3 % (ref 34–46.6)
HDLC SERPL-MCNC: 46 MG/DL
HGB BLD-MCNC: 16 G/DL (ref 11.1–15.9)
IMM GRANULOCYTES # BLD AUTO: 0 X10E3/UL (ref 0–0.1)
IMM GRANULOCYTES NFR BLD AUTO: 0 %
LDL CALC COMMENT:: ABNORMAL
LDLC SERPL CALC-MCNC: 195 MG/DL (ref 0–99)
LYMPHOCYTES # BLD AUTO: 3.2 X10E3/UL (ref 0.7–3.1)
LYMPHOCYTES NFR BLD AUTO: 34 %
MCH RBC QN AUTO: 30.8 PG (ref 26.6–33)
MCHC RBC AUTO-ENTMCNC: 33.8 G/DL (ref 31.5–35.7)
MCV RBC AUTO: 91 FL (ref 79–97)
MICROALBUMIN UR-MCNC: <3 UG/ML
MONOCYTES # BLD AUTO: 0.6 X10E3/UL (ref 0.1–0.9)
MONOCYTES NFR BLD AUTO: 7 %
NEUTROPHILS # BLD AUTO: 5.1 X10E3/UL (ref 1.4–7)
NEUTROPHILS NFR BLD AUTO: 54 %
PLATELET # BLD AUTO: 366 X10E3/UL (ref 150–450)
POTASSIUM SERPL-SCNC: 4.8 MMOL/L (ref 3.5–5.2)
PROT SERPL-MCNC: 6.6 G/DL (ref 6–8.5)
RBC # BLD AUTO: 5.19 X10E6/UL (ref 3.77–5.28)
SODIUM SERPL-SCNC: 139 MMOL/L (ref 134–144)
TRIGL SERPL-MCNC: 151 MG/DL (ref 0–149)
VLDLC SERPL CALC-MCNC: 28 MG/DL (ref 5–40)
WBC # BLD AUTO: 9.4 X10E3/UL (ref 3.4–10.8)

## 2025-02-28 DIAGNOSIS — E11.65 TYPE 2 DIABETES MELLITUS WITH HYPERGLYCEMIA, WITHOUT LONG-TERM CURRENT USE OF INSULIN: Primary | ICD-10-CM

## 2025-03-20 ENCOUNTER — PATIENT OUTREACH (OUTPATIENT)
Dept: CASE MANAGEMENT | Facility: OTHER | Age: 55
End: 2025-03-20
Payer: COMMERCIAL

## 2025-03-20 NOTE — OUTREACH NOTE
Adult Patient Profile  Questions/Answers      Flowsheet Row Most Recent Value   Symptoms/Conditions Managed at Home diabetes, type 2   Diabetes Management Strategies blood glucose testing, medication therapy   Last A1C Result 7.5 on 2/10/25   Diabetes Comment Telephone call with patient.  Discussed diabetes management.Patient is aware her last a1c went up.  Patient reports she has been unable to tolerate some of the diabetic medications and has stopped taking those.  Informed patient that she needs to let her provider know this.  She said she has not messaged her yet.  Encouraged her to so. She has been referred to endocrinology but appointment is not until 7/21/25.  Encouraged patient to continue checking blood glucose levels and to choose healthy diet.            AMBULATORY CASE MANAGEMENT NOTE    Names and Relationships of Patient/Support Persons: Contact: Lucy Hilton; Relationship: Self -   Patient Outreach    Telephone call with patient.  Discussed diabetes management.  Encouraged patient to inform provider she is not tolerating some of the diabetic medications. Patient reports she checks blood glucose and has a monitor through the [employer].  Patient has a blood pressure monitor also and is checking blood pressure.  Patient reports if she gets anxious her blood pressure increases. Encouraged patient to reach out to Employee Case Management as needed.    Education Documentation  No documentation found.        MERI PEGUERO  Ambulatory Case Management    3/20/2025, 16:29 EDT

## 2025-05-30 PROBLEM — M54.2 MUSCULOSKELETAL NECK PAIN: Status: ACTIVE | Noted: 2025-05-30

## 2025-06-16 DIAGNOSIS — F41.9 ANXIETY: ICD-10-CM

## 2025-06-16 RX ORDER — ESCITALOPRAM OXALATE 20 MG/1
20 TABLET ORAL DAILY
Qty: 90 TABLET | Refills: 1 | Status: SHIPPED | OUTPATIENT
Start: 2025-06-16

## 2025-07-07 DIAGNOSIS — Z79.899 LONG TERM CURRENT USE OF ANTIPSYCHOTIC MEDICATION: ICD-10-CM

## 2025-07-07 DIAGNOSIS — F41.9 ANXIETY: ICD-10-CM

## 2025-07-07 DIAGNOSIS — Z51.81 THERAPEUTIC DRUG MONITORING: ICD-10-CM

## 2025-07-08 RX ORDER — OXAZEPAM 10 MG
10 CAPSULE ORAL DAILY PRN
Qty: 30 CAPSULE | Refills: 0 | Status: SHIPPED | OUTPATIENT
Start: 2025-07-08

## 2025-07-08 NOTE — TELEPHONE ENCOUNTER
USD - 02/10/2025  Controlled Agreement - 02/13/2025  Last Visit - 02/10/2025  Next Visit - 08/11/2025

## 2025-08-28 ENCOUNTER — APPOINTMENT (OUTPATIENT)
Dept: GENERAL RADIOLOGY | Facility: HOSPITAL | Age: 55
End: 2025-08-28
Payer: COMMERCIAL

## 2025-08-28 ENCOUNTER — APPOINTMENT (OUTPATIENT)
Dept: CT IMAGING | Facility: HOSPITAL | Age: 55
End: 2025-08-28
Payer: COMMERCIAL

## 2025-08-28 ENCOUNTER — HOSPITAL ENCOUNTER (EMERGENCY)
Facility: HOSPITAL | Age: 55
Discharge: HOME | End: 2025-08-29
Payer: COMMERCIAL

## 2025-08-28 DIAGNOSIS — M25.531 RIGHT WRIST PAIN: Primary | ICD-10-CM

## 2025-08-28 DIAGNOSIS — S52.571A OTHER CLOSED INTRA-ARTICULAR FRACTURE OF DISTAL END OF RIGHT RADIUS, INITIAL ENCOUNTER: ICD-10-CM

## 2025-08-28 DIAGNOSIS — S52.571A INTRA-ARTICULAR FRACTURE OF DISTAL END OF RIGHT RADIUS WITH VOLAR ANGULATION, INITIAL ENCOUNTER: ICD-10-CM

## 2025-08-28 PROCEDURE — 25010000002 ONDANSETRON PER 1 MG

## 2025-08-28 PROCEDURE — 73110 X-RAY EXAM OF WRIST: CPT

## 2025-08-28 PROCEDURE — 72072 X-RAY EXAM THORAC SPINE 3VWS: CPT

## 2025-08-28 PROCEDURE — 25010000002 DIAZEPAM PER 5 MG

## 2025-08-28 PROCEDURE — 73130 X-RAY EXAM OF HAND: CPT

## 2025-08-28 PROCEDURE — 25010000002 KETOROLAC TROMETHAMINE PER 15 MG

## 2025-08-28 PROCEDURE — 25010000002 HYDROMORPHONE PER 4 MG

## 2025-08-28 PROCEDURE — 25010000002 MIDAZOLAM PER 1 MG

## 2025-08-28 PROCEDURE — 70450 CT HEAD/BRAIN W/O DYE: CPT

## 2025-08-28 PROCEDURE — 73100 X-RAY EXAM OF WRIST: CPT

## 2025-08-28 PROCEDURE — 72125 CT NECK SPINE W/O DYE: CPT

## 2025-08-28 RX ORDER — MIDAZOLAM HYDROCHLORIDE 1 MG/ML
INJECTION, SOLUTION INTRAMUSCULAR; INTRAVENOUS
Status: COMPLETED
Start: 2025-08-28 | End: 2025-08-28

## 2025-08-28 RX ORDER — KETOROLAC TROMETHAMINE 30 MG/ML
15 INJECTION, SOLUTION INTRAMUSCULAR; INTRAVENOUS ONCE
Status: COMPLETED | OUTPATIENT
Start: 2025-08-28 | End: 2025-08-28

## 2025-08-28 RX ORDER — HYDROMORPHONE HYDROCHLORIDE 1 MG/ML
0.5 INJECTION, SOLUTION INTRAMUSCULAR; INTRAVENOUS; SUBCUTANEOUS ONCE
Refills: 0 | Status: COMPLETED | OUTPATIENT
Start: 2025-08-28 | End: 2025-08-28

## 2025-08-28 RX ORDER — SODIUM CHLORIDE 0.9 % (FLUSH) 0.9 %
10 SYRINGE (ML) INJECTION AS NEEDED
Status: DISCONTINUED | OUTPATIENT
Start: 2025-08-28 | End: 2025-08-29 | Stop reason: HOSPADM

## 2025-08-28 RX ORDER — ONDANSETRON 2 MG/ML
4 INJECTION INTRAMUSCULAR; INTRAVENOUS ONCE
Status: COMPLETED | OUTPATIENT
Start: 2025-08-28 | End: 2025-08-28

## 2025-08-28 RX ORDER — DIAZEPAM 10 MG/2ML
5 INJECTION, SOLUTION INTRAMUSCULAR; INTRAVENOUS ONCE
Status: COMPLETED | OUTPATIENT
Start: 2025-08-28 | End: 2025-08-28

## 2025-08-28 RX ADMIN — Medication 85 MG: at 23:40

## 2025-08-28 RX ADMIN — KETOROLAC TROMETHAMINE 15 MG: 30 INJECTION INTRAMUSCULAR; INTRAVENOUS at 20:37

## 2025-08-28 RX ADMIN — Medication 30 MG: at 23:48

## 2025-08-28 RX ADMIN — MIDAZOLAM HYDROCHLORIDE 2 MG: 1 INJECTION, SOLUTION INTRAMUSCULAR; INTRAVENOUS at 23:42

## 2025-08-28 RX ADMIN — MIDAZOLAM 2 MG: 1 INJECTION INTRAMUSCULAR; INTRAVENOUS at 23:42

## 2025-08-28 RX ADMIN — ONDANSETRON 4 MG: 2 INJECTION, SOLUTION INTRAMUSCULAR; INTRAVENOUS at 20:37

## 2025-08-28 RX ADMIN — DIAZEPAM 5 MG: 10 INJECTION, SOLUTION INTRAMUSCULAR; INTRAVENOUS at 23:18

## 2025-08-28 RX ADMIN — HYDROMORPHONE HYDROCHLORIDE 0.5 MG: 1 INJECTION, SOLUTION INTRAMUSCULAR; INTRAVENOUS; SUBCUTANEOUS at 20:37

## 2025-08-28 RX ADMIN — Medication 15 MG: at 23:43

## 2025-08-29 VITALS
SYSTOLIC BLOOD PRESSURE: 147 MMHG | WEIGHT: 187.39 LBS | OXYGEN SATURATION: 98 % | DIASTOLIC BLOOD PRESSURE: 76 MMHG | HEIGHT: 63 IN | RESPIRATION RATE: 15 BRPM | TEMPERATURE: 97.5 F | HEART RATE: 81 BPM | BODY MASS INDEX: 33.2 KG/M2

## 2025-08-29 PROCEDURE — 25010000002 METOCLOPRAMIDE PER 10 MG

## 2025-08-29 PROCEDURE — 25010000002 ONDANSETRON PER 1 MG

## 2025-08-29 RX ORDER — KETAMINE HCL IN NACL, ISO-OSM 100MG/10ML
SYRINGE (ML) INJECTION
Status: COMPLETED | OUTPATIENT
Start: 2025-08-28 | End: 2025-08-29

## 2025-08-29 RX ORDER — ONDANSETRON 2 MG/ML
4 INJECTION INTRAMUSCULAR; INTRAVENOUS ONCE
Status: COMPLETED | OUTPATIENT
Start: 2025-08-29 | End: 2025-08-29

## 2025-08-29 RX ORDER — HYDROCODONE BITARTRATE AND ACETAMINOPHEN 5; 325 MG/1; MG/1
1 TABLET ORAL EVERY 6 HOURS PRN
Qty: 12 TABLET | Refills: 0 | Status: SHIPPED | OUTPATIENT
Start: 2025-08-29

## 2025-08-29 RX ORDER — MIDAZOLAM HYDROCHLORIDE 1 MG/ML
2 INJECTION, SOLUTION INTRAMUSCULAR; INTRAVENOUS ONCE
Status: DISCONTINUED | OUTPATIENT
Start: 2025-08-29 | End: 2025-08-29

## 2025-08-29 RX ORDER — ONDANSETRON 4 MG/1
4 TABLET, ORALLY DISINTEGRATING ORAL EVERY 8 HOURS PRN
Qty: 15 TABLET | Refills: 0 | Status: SHIPPED | OUTPATIENT
Start: 2025-08-29

## 2025-08-29 RX ORDER — METOCLOPRAMIDE HYDROCHLORIDE 5 MG/ML
10 INJECTION INTRAMUSCULAR; INTRAVENOUS ONCE
Status: COMPLETED | OUTPATIENT
Start: 2025-08-29 | End: 2025-08-29

## 2025-08-29 RX ORDER — MIDAZOLAM HYDROCHLORIDE 1 MG/ML
2 INJECTION, SOLUTION INTRAMUSCULAR; INTRAVENOUS ONCE
Status: COMPLETED | OUTPATIENT
Start: 2025-08-28 | End: 2025-08-28

## 2025-08-29 RX ADMIN — ONDANSETRON 4 MG: 2 INJECTION, SOLUTION INTRAMUSCULAR; INTRAVENOUS at 00:51

## 2025-08-29 RX ADMIN — METOCLOPRAMIDE 10 MG: 5 INJECTION, SOLUTION INTRAMUSCULAR; INTRAVENOUS at 01:35

## 2025-08-30 ENCOUNTER — HOSPITAL ENCOUNTER (EMERGENCY)
Facility: HOSPITAL | Age: 55
Discharge: HOME | End: 2025-08-30
Attending: EMERGENCY MEDICINE
Payer: COMMERCIAL

## 2025-08-30 VITALS
OXYGEN SATURATION: 98 % | SYSTOLIC BLOOD PRESSURE: 158 MMHG | BODY MASS INDEX: 32.96 KG/M2 | DIASTOLIC BLOOD PRESSURE: 98 MMHG | WEIGHT: 186 LBS | HEIGHT: 63 IN | TEMPERATURE: 98 F | RESPIRATION RATE: 18 BRPM | HEART RATE: 99 BPM

## 2025-08-30 DIAGNOSIS — Z47.89 CAST DISCOMFORT: Primary | ICD-10-CM

## 2025-08-30 PROCEDURE — 99282 EMERGENCY DEPT VISIT SF MDM: CPT | Performed by: EMERGENCY MEDICINE

## (undated) DEVICE — SYR LL TP 10ML STRL

## (undated) DEVICE — OSC HYSTEROSCOPY: Brand: MEDLINE INDUSTRIES, INC.

## (undated) DEVICE — NDL SPINE 22G 31/2IN BLK

## (undated) DEVICE — MEDI-VAC YANKAUER SUCTION HANDLE W/BULBOUS TIP: Brand: CARDINAL HEALTH

## (undated) DEVICE — PENCL E/S HNDSWCH ROCKR CB

## (undated) DEVICE — PAD GRND REM POLYHESIVE A/ DISP

## (undated) DEVICE — Device

## (undated) DEVICE — GLV SURG BIOGEL LTX PF 6 1/2